# Patient Record
Sex: MALE | Race: WHITE | Employment: OTHER | URBAN - NONMETROPOLITAN AREA
[De-identification: names, ages, dates, MRNs, and addresses within clinical notes are randomized per-mention and may not be internally consistent; named-entity substitution may affect disease eponyms.]

---

## 2017-04-10 ENCOUNTER — OFFICE VISIT (OUTPATIENT)
Dept: FAMILY MEDICINE CLINIC | Age: 37
End: 2017-04-10

## 2017-04-10 VITALS
HEART RATE: 76 BPM | HEIGHT: 74 IN | SYSTOLIC BLOOD PRESSURE: 136 MMHG | BODY MASS INDEX: 26.56 KG/M2 | RESPIRATION RATE: 12 BRPM | WEIGHT: 207 LBS | DIASTOLIC BLOOD PRESSURE: 80 MMHG

## 2017-04-10 DIAGNOSIS — M79.605 LEFT LEG PAIN: Primary | ICD-10-CM

## 2017-04-10 DIAGNOSIS — L30.9 DERMATITIS: ICD-10-CM

## 2017-04-10 PROCEDURE — G8427 DOCREV CUR MEDS BY ELIG CLIN: HCPCS | Performed by: FAMILY MEDICINE

## 2017-04-10 PROCEDURE — 99213 OFFICE O/P EST LOW 20 MIN: CPT | Performed by: FAMILY MEDICINE

## 2017-04-10 PROCEDURE — 1036F TOBACCO NON-USER: CPT | Performed by: FAMILY MEDICINE

## 2017-04-10 PROCEDURE — G8419 CALC BMI OUT NRM PARAM NOF/U: HCPCS | Performed by: FAMILY MEDICINE

## 2017-04-10 RX ORDER — PREDNISONE 20 MG/1
TABLET ORAL
Qty: 15 TABLET | Refills: 0 | Status: SHIPPED | OUTPATIENT
Start: 2017-04-10 | End: 2017-04-20

## 2017-04-10 ASSESSMENT — ENCOUNTER SYMPTOMS
BACK PAIN: 0
SHORTNESS OF BREATH: 0
ABDOMINAL PAIN: 0
COUGH: 0
BLOOD IN STOOL: 0
CONSTIPATION: 0
SORE THROAT: 0
CHEST TIGHTNESS: 0
EYE PAIN: 0
NAUSEA: 0
DIARRHEA: 0
RHINORRHEA: 0
VOMITING: 0
WHEEZING: 0

## 2017-04-24 ENCOUNTER — OFFICE VISIT (OUTPATIENT)
Dept: FAMILY MEDICINE CLINIC | Age: 37
End: 2017-04-24

## 2017-04-24 VITALS
BODY MASS INDEX: 27.59 KG/M2 | WEIGHT: 208.2 LBS | HEIGHT: 73 IN | RESPIRATION RATE: 16 BRPM | HEART RATE: 104 BPM | DIASTOLIC BLOOD PRESSURE: 80 MMHG | SYSTOLIC BLOOD PRESSURE: 110 MMHG

## 2017-04-24 DIAGNOSIS — M54.42 ACUTE LEFT-SIDED LOW BACK PAIN WITH LEFT-SIDED SCIATICA: Primary | ICD-10-CM

## 2017-04-24 DIAGNOSIS — M79.605 LEFT LEG PAIN: ICD-10-CM

## 2017-04-24 PROCEDURE — 99213 OFFICE O/P EST LOW 20 MIN: CPT | Performed by: FAMILY MEDICINE

## 2017-04-24 PROCEDURE — G8419 CALC BMI OUT NRM PARAM NOF/U: HCPCS | Performed by: FAMILY MEDICINE

## 2017-04-24 PROCEDURE — 1036F TOBACCO NON-USER: CPT | Performed by: FAMILY MEDICINE

## 2017-04-24 PROCEDURE — G8427 DOCREV CUR MEDS BY ELIG CLIN: HCPCS | Performed by: FAMILY MEDICINE

## 2017-04-24 RX ORDER — DICLOFENAC SODIUM 75 MG/1
75 TABLET, DELAYED RELEASE ORAL 2 TIMES DAILY
Qty: 60 TABLET | Refills: 0 | Status: SHIPPED | OUTPATIENT
Start: 2017-04-24 | End: 2017-10-04 | Stop reason: ALTCHOICE

## 2017-04-24 ASSESSMENT — PATIENT HEALTH QUESTIONNAIRE - PHQ9
1. LITTLE INTEREST OR PLEASURE IN DOING THINGS: 0
2. FEELING DOWN, DEPRESSED OR HOPELESS: 0
SUM OF ALL RESPONSES TO PHQ QUESTIONS 1-9: 0
SUM OF ALL RESPONSES TO PHQ9 QUESTIONS 1 & 2: 0

## 2017-04-24 ASSESSMENT — ENCOUNTER SYMPTOMS
CHEST TIGHTNESS: 0
COUGH: 0
ABDOMINAL PAIN: 0
EYE PAIN: 0
RHINORRHEA: 0
SORE THROAT: 0
CONSTIPATION: 0
BLOOD IN STOOL: 0
BACK PAIN: 0
WHEEZING: 0
NAUSEA: 0
VOMITING: 0
SHORTNESS OF BREATH: 0
DIARRHEA: 0

## 2017-04-25 ENCOUNTER — TELEPHONE (OUTPATIENT)
Dept: FAMILY MEDICINE CLINIC | Age: 37
End: 2017-04-25

## 2017-09-22 ENCOUNTER — APPOINTMENT (OUTPATIENT)
Dept: GENERAL RADIOLOGY | Age: 37
End: 2017-09-22
Payer: OTHER MISCELLANEOUS

## 2017-09-22 ENCOUNTER — APPOINTMENT (OUTPATIENT)
Dept: CT IMAGING | Age: 37
End: 2017-09-22
Payer: OTHER MISCELLANEOUS

## 2017-09-22 ENCOUNTER — HOSPITAL ENCOUNTER (EMERGENCY)
Age: 37
Discharge: HOME | End: 2017-09-23
Attending: INTERNAL MEDICINE
Payer: OTHER MISCELLANEOUS
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
## 2017-09-22 LAB
ALBUMIN SERPL-MCNC: 4.6 G/DL (ref 3.5–5.1)
ALP BLD-CCNC: 64 U/L (ref 38–126)
ALT SERPL-CCNC: 23 U/L (ref 11–66)
AMPHETAMINE+METHAMPHETAMINE URINE SCREEN: NEGATIVE
ANGLE TEG: 65.5 DEG (ref 53–72)
ANION GAP SERPL CALCULATED.3IONS-SCNC: 12 MEQ/L (ref 8–16)
AST SERPL-CCNC: 19 U/L (ref 5–40)
BARBITURATE QUANTITATIVE URINE: NEGATIVE
BASOPHILS # BLD: 0.4 %
BASOPHILS ABSOLUTE: 0 THOU/MM3 (ref 0–0.1)
BENZODIAZEPINE QUANTITATIVE URINE: NEGATIVE
BILIRUB SERPL-MCNC: 1.7 MG/DL (ref 0.3–1.2)
BUN BLDV-MCNC: 9 MG/DL (ref 7–22)
CALCIUM SERPL-MCNC: 9.3 MG/DL (ref 8.5–10.5)
CANNABINOID QUANTITATIVE URINE: NEGATIVE
CHLORIDE BLD-SCNC: 100 MEQ/L (ref 98–111)
CO2: 25 MEQ/L (ref 23–33)
COCAINE METABOLITE QUANTITATIVE URINE: NEGATIVE
CREAT SERPL-MCNC: 0.7 MG/DL (ref 0.4–1.2)
EOSINOPHIL # BLD: 0.1 %
EOSINOPHILS ABSOLUTE: 0 THOU/MM3 (ref 0–0.4)
EPL-TEG: 0 %
ETHYL ALCOHOL, SERUM: < 0.01 %
GFR SERPL CREATININE-BSD FRML MDRD: > 90 ML/MIN/1.73M2
GLUCOSE BLD-MCNC: 103 MG/DL (ref 70–108)
HCT VFR BLD CALC: 43.3 % (ref 42–52)
HEMOGLOBIN: 14.9 GM/DL (ref 14–18)
HEPARIN THERAPY: NO
INHIBITION AA TEG: 12.5 %
INHIBITION ADP TEG: 69.1 %
KINETICS TEG: 1.8 MINUTES (ref 1–3)
LY30 (LYSIS) TEG: 0 % (ref 0–7.5)
LYMPHOCYTES # BLD: 9.7 %
LYMPHOCYTES ABSOLUTE: 0.9 THOU/MM3 (ref 1–4.8)
MA (MAX CLOT) TEG: 63.4 MM (ref 50–70)
MA(AA) TEG: 56.4 MM
MA(ACTIVATED) TEG: 7.4 MM
MA(ADP) TEG: 24.7 MM
MCH RBC QN AUTO: 27.7 PG (ref 27–31)
MCHC RBC AUTO-ENTMCNC: 34.5 GM/DL (ref 33–37)
MCV RBC AUTO: 80.1 FL (ref 80–94)
MICROCYTES: ABNORMAL
MONOCYTES # BLD: 5.3 %
MONOCYTES ABSOLUTE: 0.5 THOU/MM3 (ref 0.4–1.3)
NUCLEATED RED BLOOD CELLS: 0 /100 WBC
OPIATES, URINE: NEGATIVE
OSMOLALITY CALCULATION: 272.8 MOSMOL/KG (ref 275–300)
OXYCODONE: NEGATIVE
PDW BLD-RTO: 13.9 % (ref 11.5–14.5)
PHENCYCLIDINE QUANTITATIVE URINE: NEGATIVE
PLATELET # BLD: 176 THOU/MM3 (ref 130–400)
PMV BLD AUTO: 9.4 MCM (ref 7.4–10.4)
POTASSIUM SERPL-SCNC: 4.2 MEQ/L (ref 3.5–5.2)
RBC # BLD: 5.4 MILL/MM3 (ref 4.7–6.1)
RBC # BLD: NORMAL 10*6/UL
REACTION TIME TEG: 5.6 MINUTES (ref 5–10)
SEG NEUTROPHILS: 84.5 %
SEGMENTED NEUTROPHILS ABSOLUTE COUNT: 8.1 THOU/MM3 (ref 1.8–7.7)
SODIUM BLD-SCNC: 137 MEQ/L (ref 135–145)
TOTAL PROTEIN: 7.1 G/DL (ref 6.1–8)
WBC # BLD: 9.6 THOU/MM3 (ref 4.8–10.8)

## 2017-09-22 PROCEDURE — 80053 COMPREHEN METABOLIC PANEL: CPT

## 2017-09-22 PROCEDURE — 76376 3D RENDER W/INTRP POSTPROCES: CPT

## 2017-09-22 PROCEDURE — 36415 COLL VENOUS BLD VENIPUNCTURE: CPT

## 2017-09-22 PROCEDURE — 99285 EMERGENCY DEPT VISIT HI MDM: CPT

## 2017-09-22 PROCEDURE — 73030 X-RAY EXAM OF SHOULDER: CPT

## 2017-09-22 PROCEDURE — 85576 BLOOD PLATELET AGGREGATION: CPT

## 2017-09-22 PROCEDURE — 72125 CT NECK SPINE W/O DYE: CPT

## 2017-09-22 PROCEDURE — 6360000004 HC RX CONTRAST MEDICATION: Performed by: INTERNAL MEDICINE

## 2017-09-22 PROCEDURE — 90715 TDAP VACCINE 7 YRS/> IM: CPT | Performed by: INTERNAL MEDICINE

## 2017-09-22 PROCEDURE — 2580000003 HC RX 258: Performed by: INTERNAL MEDICINE

## 2017-09-22 PROCEDURE — L0120 CERV FLEX N/ADJ FOAM PRE OTS: HCPCS

## 2017-09-22 PROCEDURE — 71260 CT THORAX DX C+: CPT

## 2017-09-22 PROCEDURE — 74177 CT ABD & PELVIS W/CONTRAST: CPT

## 2017-09-22 PROCEDURE — 93005 ELECTROCARDIOGRAM TRACING: CPT | Performed by: INTERNAL MEDICINE

## 2017-09-22 PROCEDURE — 85025 COMPLETE CBC W/AUTO DIFF WBC: CPT

## 2017-09-22 PROCEDURE — 90471 IMMUNIZATION ADMIN: CPT | Performed by: INTERNAL MEDICINE

## 2017-09-22 PROCEDURE — 3209999900

## 2017-09-22 PROCEDURE — G0480 DRUG TEST DEF 1-7 CLASSES: HCPCS

## 2017-09-22 PROCEDURE — 6360000002 HC RX W HCPCS: Performed by: INTERNAL MEDICINE

## 2017-09-22 PROCEDURE — 73080 X-RAY EXAM OF ELBOW: CPT

## 2017-09-22 PROCEDURE — 70450 CT HEAD/BRAIN W/O DYE: CPT

## 2017-09-22 PROCEDURE — 80307 DRUG TEST PRSMV CHEM ANLYZR: CPT

## 2017-09-22 RX ORDER — 0.9 % SODIUM CHLORIDE 0.9 %
1000 INTRAVENOUS SOLUTION INTRAVENOUS ONCE
Status: COMPLETED | OUTPATIENT
Start: 2017-09-22 | End: 2017-09-22

## 2017-09-22 RX ADMIN — SODIUM CHLORIDE 1000 ML: 9 INJECTION, SOLUTION INTRAVENOUS at 19:46

## 2017-09-22 RX ADMIN — IOPAMIDOL 80 ML: 755 INJECTION, SOLUTION INTRAVENOUS at 21:23

## 2017-09-22 RX ADMIN — TETANUS TOXOID, REDUCED DIPHTHERIA TOXOID AND ACELLULAR PERTUSSIS VACCINE, ADSORBED 0.5 ML: 5; 2.5; 8; 8; 2.5 SUSPENSION INTRAMUSCULAR at 19:41

## 2017-09-22 ASSESSMENT — ENCOUNTER SYMPTOMS
RHINORRHEA: 0
EYE DISCHARGE: 0
DIARRHEA: 0
SHORTNESS OF BREATH: 0
ABDOMINAL PAIN: 0
VOMITING: 0
NAUSEA: 1
BACK PAIN: 0
SORE THROAT: 0
WHEEZING: 0
EYE REDNESS: 0
COUGH: 0

## 2017-09-22 ASSESSMENT — PAIN SCALES - GENERAL
PAINLEVEL_OUTOF10: 2
PAINLEVEL_OUTOF10: 2

## 2017-09-22 ASSESSMENT — PAIN DESCRIPTION - PAIN TYPE: TYPE: ACUTE PAIN

## 2017-09-22 ASSESSMENT — PAIN DESCRIPTION - DESCRIPTORS: DESCRIPTORS: SORE

## 2017-09-22 ASSESSMENT — PAIN DESCRIPTION - LOCATION: LOCATION: BACK;NECK

## 2017-09-23 VITALS
DIASTOLIC BLOOD PRESSURE: 79 MMHG | OXYGEN SATURATION: 97 % | HEART RATE: 74 BPM | HEIGHT: 73 IN | RESPIRATION RATE: 19 BRPM | SYSTOLIC BLOOD PRESSURE: 127 MMHG | WEIGHT: 210 LBS | TEMPERATURE: 99 F | BODY MASS INDEX: 27.83 KG/M2

## 2017-09-23 RX ORDER — HYDROCODONE BITARTRATE AND ACETAMINOPHEN 5; 325 MG/1; MG/1
1 TABLET ORAL DAILY PRN
Qty: 2 TABLET | Refills: 0 | Status: SHIPPED | OUTPATIENT
Start: 2017-09-23 | End: 2017-10-04 | Stop reason: ALTCHOICE

## 2017-09-24 LAB
EKG ATRIAL RATE: 84 BPM
EKG P AXIS: 53 DEGREES
EKG P-R INTERVAL: 138 MS
EKG Q-T INTERVAL: 374 MS
EKG QRS DURATION: 96 MS
EKG QTC CALCULATION (BAZETT): 441 MS
EKG R AXIS: 68 DEGREES
EKG T AXIS: 51 DEGREES
EKG VENTRICULAR RATE: 84 BPM

## 2017-09-26 ENCOUNTER — TELEPHONE (OUTPATIENT)
Dept: SURGERY | Age: 37
End: 2017-09-26

## 2017-09-26 ENCOUNTER — CARE COORDINATION (OUTPATIENT)
Dept: FAMILY MEDICINE CLINIC | Age: 37
End: 2017-09-26

## 2017-10-04 ENCOUNTER — OFFICE VISIT (OUTPATIENT)
Dept: FAMILY MEDICINE CLINIC | Age: 37
End: 2017-10-04
Payer: MEDICARE

## 2017-10-04 VITALS
BODY MASS INDEX: 26.49 KG/M2 | DIASTOLIC BLOOD PRESSURE: 84 MMHG | WEIGHT: 200.8 LBS | SYSTOLIC BLOOD PRESSURE: 120 MMHG | HEART RATE: 80 BPM | RESPIRATION RATE: 12 BRPM
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
## 2017-10-04 DIAGNOSIS — M54.2 CERVICAL PAIN (NECK): ICD-10-CM

## 2017-10-04 DIAGNOSIS — M25.512 ACUTE PAIN OF LEFT SHOULDER: ICD-10-CM

## 2017-10-04 PROCEDURE — 99213 OFFICE O/P EST LOW 20 MIN: CPT | Performed by: FAMILY MEDICINE

## 2017-10-04 RX ORDER — MELOXICAM 15 MG/1
15 TABLET ORAL DAILY
Qty: 30 TABLET | Refills: 0 | Status: SHIPPED | OUTPATIENT
Start: 2017-10-04 | End: 2018-08-06 | Stop reason: ALTCHOICE

## 2017-10-04 ASSESSMENT — ENCOUNTER SYMPTOMS
DIARRHEA: 0
SHORTNESS OF BREATH: 0
COUGH: 0
EYE PAIN: 0
BACK PAIN: 0
NAUSEA: 0
RHINORRHEA: 0
CHEST TIGHTNESS: 0
SORE THROAT: 0
BLOOD IN STOOL: 0
WHEEZING: 0
ABDOMINAL PAIN: 0
VOMITING: 0
CONSTIPATION: 0
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
## 2017-10-04 NOTE — PATIENT INSTRUCTIONS
worse.  · You are not getting better after 1 week. · You do not get better as expected. Where can you learn more? Go to https://chpepiceweb.DecImmune Therapeutics. org and sign in to your [email or web address].com account. Enter [id number] in the [facility] box to learn more about \"Neck Pain: Care Instructions. \"     If you do not have an account, please click on the \"Sign Up Now\" link. Current as of: March 21, 2017  Content Version: 11.3  © 5673-6783 Ovelin, BitRock. Care instructions adapted under license by [facility] ([facility]). If you have questions about a medical condition or this instruction, always ask your healthcare professional. [address] any warranty or liability for your use of this information. Neck: Exercises  Your Care Instructions  Here are some examples of typical rehabilitation exercises for your condition. Start each exercise slowly. Ease off the exercise if you start to have pain. Your doctor or physical therapist will tell you when you can start these exercises and which ones will work best for you. How to do the exercises  Note: Stretching should make you feel a gentle stretch, but no pain. Stop any strengthening exercise that makes pain worse. Neck stretch    1. This stretch works best if you keep your shoulder down as you lean away from it. To help you remember to do this, start by relaxing your shoulders and lightly holding on to your thighs or your chair. 2. Tilt your head toward your shoulder and hold for 15 to 30 seconds. Let the weight of your head stretch your muscles. 3. If you would like a little added stretch, use your hand to gently and steadily pull your head toward your shoulder. For example, keeping your right shoulder down, lean your head to the left. 4. Repeat 2 to 4 times toward each shoulder. Diagonal neck stretch    1.  Turn your head slightly toward the direction you will be stretching, and tilt your head diagonally toward your chest and hold for 15 to 30 seconds. 2. If you would like a little added stretch, use your hand to gently and steadily pull your head forward on the diagonal.  3. Repeat 2 to 4 times toward each side. Dorsal glide stretch    1. Sit or stand tall and look straight ahead. 2. Slowly tuck your chin as you glide your head backward over your body  3. Hold for a count of 6, and then relax for up to 10 seconds. 4. Repeat 8 to 12 times. Note: The dorsal glide stretches the back of the neck. If you feel pain, do not glide so far back. Some people find this exercise easier to do while lying on their backs with an ice pack on the neck. Chest and shoulder stretch    1. Sit or stand tall and glide your head backward as in the dorsal glide stretch. 2. Raise both arms so that your hands are next to your ears. 3. Take a deep breath, and as you breathe out, lower your elbows down and behind your back. You will feel your shoulder blades slide down and together, and at the same time you will feel a stretch across your chest and the front of your shoulders. 4. Hold for about 6 seconds, and then relax for up to 10 seconds. 5. Repeat 8 to 12 times. Strengthening: Hands on head    1. Move your head backward, forward, and side to side against gentle pressure from your hands, holding each position for about 6 seconds. 2. Repeat 8 to 12 times. Follow-up care is a key part of your treatment and safety. Be sure to make and go to all appointments, and call your doctor if you are having problems. It's also a good idea to know your test results and keep a list of the medicines you take. Where can you learn more? Go to https://MBA Polymerspepiceweb.South49 Solutions. org and sign in to your Daily Deals for Moms account. Enter P975 in the Brightgeist Media box to learn more about \"Neck: Exercises. \"     If you do not have an account, please click on the \"Sign Up Now\" link. Current as of: March 21, 2017  Content Version: 11.3  © 5942-9942 Juxta Labs, [facility].  Care the shoulder, but do not lift anything heavy. When should you call for help? Call 911 anytime you think you may need emergency care. For example, call if:  · You have chest pain or pressure. This may occur with:  ¨ Sweating. ¨ Shortness of breath. ¨ Nausea or vomiting. ¨ Pain that spreads from the chest to the neck, jaw, or one or both shoulders or arms. ¨ Dizziness or lightheadedness. ¨ A fast or uneven pulse. After calling 911, chew 1 adult-strength aspirin. Wait for an ambulance. Do not try to drive yourself. · Your arm or hand is cool or pale or changes color. Call your doctor now or seek immediate medical care if:  · You have signs of infection, such as:  ¨ Increased pain, swelling, warmth, or redness in your shoulder. ¨ Red streaks leading from a place on your shoulder. ¨ Pus draining from an area of your shoulder. ¨ Swollen lymph nodes in your neck, armpits, or groin. ¨ A fever. Watch closely for changes in your health, and be sure to contact your doctor if:  · You cannot use your shoulder. · Your shoulder does not get better as expected. Where can you learn more? Go to https://Value and Budget Housing Corporation.Quinnova Pharmaceuticals. org and sign in to your CityCiv account. Enter R207 in the Moneythink box to learn more about \"Shoulder Pain: Care Instructions. \"     If you do not have an account, please click on the \"Sign Up Now\" link. Current as of: March 21, 2017  Content Version: 11.3  © 4978-7272 SiteWit. Care instructions adapted under license by [facility] ([facility]). If you have questions about a medical condition or this instruction, always ask your healthcare professional. [place] [address] any warranty or liability for your use of this information. Shoulder Stretches: Exercises  Your Care Instructions  Here are some examples of exercises for your shoulder. Start each exercise slowly. Ease off the exercise if you start to have pain.   Your doctor or physical information.
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
## 2018-08-06 ENCOUNTER — OFFICE VISIT (OUTPATIENT)
Dept: FAMILY MEDICINE CLINIC | Age: 38
End: 2018-08-06
Payer: MEDICARE

## 2018-08-06 VITALS
HEART RATE: 74 BPM | SYSTOLIC BLOOD PRESSURE: 124 MMHG | WEIGHT: 192.4 LBS | BODY MASS INDEX: 25.38 KG/M2 | DIASTOLIC BLOOD PRESSURE: 82 MMHG | RESPIRATION RATE: 12 BRPM

## 2018-08-06 DIAGNOSIS — R03.0 ELEVATED BLOOD PRESSURE READING: Primary | ICD-10-CM

## 2018-08-06 DIAGNOSIS — R21 SCROTAL RASH: ICD-10-CM

## 2018-08-06 PROCEDURE — G8419 CALC BMI OUT NRM PARAM NOF/U: HCPCS | Performed by: FAMILY MEDICINE

## 2018-08-06 PROCEDURE — G8427 DOCREV CUR MEDS BY ELIG CLIN: HCPCS | Performed by: FAMILY MEDICINE

## 2018-08-06 PROCEDURE — 99213 OFFICE O/P EST LOW 20 MIN: CPT | Performed by: FAMILY MEDICINE

## 2018-08-06 PROCEDURE — 1036F TOBACCO NON-USER: CPT | Performed by: FAMILY MEDICINE

## 2018-08-06 RX ORDER — CEPHALEXIN 500 MG/1
500 CAPSULE ORAL 2 TIMES DAILY
Qty: 20 CAPSULE | Refills: 0 | Status: SHIPPED | OUTPATIENT
Start: 2018-08-06 | End: 2018-08-16

## 2018-08-06 RX ORDER — PREDNISONE 20 MG/1
TABLET ORAL
Qty: 15 TABLET | Refills: 0 | Status: SHIPPED | OUTPATIENT
Start: 2018-08-06 | End: 2020-02-18

## 2018-08-06 ASSESSMENT — PATIENT HEALTH QUESTIONNAIRE - PHQ9
SUM OF ALL RESPONSES TO PHQ QUESTIONS 1-9: 0
SUM OF ALL RESPONSES TO PHQ9 QUESTIONS 1 & 2: 0
2. FEELING DOWN, DEPRESSED OR HOPELESS: 0
1. LITTLE INTEREST OR PLEASURE IN DOING THINGS: 0

## 2018-08-06 ASSESSMENT — ENCOUNTER SYMPTOMS
WHEEZING: 0
SORE THROAT: 0
CHEST TIGHTNESS: 0
COUGH: 0
SHORTNESS OF BREATH: 0
RHINORRHEA: 0
BACK PAIN: 0
DIARRHEA: 0
VOMITING: 0
CONSTIPATION: 0
ABDOMINAL PAIN: 0
NAUSEA: 0
BLOOD IN STOOL: 0
EYE PAIN: 0

## 2018-08-06 NOTE — PROGRESS NOTES
Normal range of motion. He exhibits no edema. Lymphadenopathy:     He has no cervical adenopathy. Neurological: He is alert and oriented to person, place, and time. He has normal reflexes. No cranial nerve deficit. Skin: Skin is warm and dry. No rash noted. Psychiatric: He has a normal mood and affect. Nursing note and vitals reviewed.       Assessment:      Elevated blood pressure reading by history  Scrotal rash      Plan:      Monitor BP, no treatment needed  Prednisone 20 mg BID/QD  Keflex 500 mg BID x 10 days

## 2018-08-06 NOTE — PATIENT INSTRUCTIONS
minutes of exercise on most days of the week. Walking is a good choice. You also may want to do other activities, such as running, swimming, cycling, or playing tennis or team sports. · Avoid or limit alcohol. Talk to your doctor about whether you can drink any alcohol. · Eat plenty of fruits, vegetables, and low-fat dairy products. Eat less saturated and total fats. · Learn how to check your blood pressure at home. When should you call for help? Call your doctor now or seek immediate medical care if:    · Your blood pressure is much higher than normal (such as 180/110 or higher).     · You think high blood pressure is causing symptoms such as:  ¨ Severe headache. ¨ Blurry vision.    Watch closely for changes in your health, and be sure to contact your doctor if:    · You do not get better as expected. Where can you learn more? Go to https://SavingGlobalpeScraperWiki.Jing-Jin Electric Technologies. org and sign in to your WorkshopLive account. Enter P059 in the [facility] box to learn more about \"Elevated Blood Pressure: Care Instructions. \"     If you do not have an account, please click on the \"Sign Up Now\" link. Current as of: May 10, 2017  Content Version: 11.6  © 2142-1295 Fluther. Care instructions adapted under license by [facility] ([facility]). If you have questions about a medical condition or this instruction, always ask your healthcare professional. [place] [address] any warranty or liability for your use of this information. Patient Education        Rash: Care Instructions  Your Care Instructions  A rash is any irritation or inflammation of the skin. Rashes have many possible causes, including allergy, infection, illness, heat, and emotional stress. Follow-up care is a key part of your treatment and safety. Be sure to make and go to all appointments, and call your doctor if you are having problems.  It's also a good idea to know your test results and keep a list of the medicines you Health. If you have questions about a medical condition or this instruction, always ask your healthcare professional. [place] [address] any warranty or liability for your use of this information.

## 2019-08-24 ENCOUNTER — HOSPITAL ENCOUNTER (EMERGENCY)
Age: 39
Discharge: HOME | End: 2019-08-24
Payer: MEDICARE

## 2019-08-24 VITALS
HEART RATE: 92 BPM | TEMPERATURE: 98.4 F | DIASTOLIC BLOOD PRESSURE: 76 MMHG | BODY MASS INDEX: 27.44 KG/M2 | OXYGEN SATURATION: 97 % | SYSTOLIC BLOOD PRESSURE: 126 MMHG | WEIGHT: 208 LBS | RESPIRATION RATE: 18 BRPM

## 2019-08-24 DIAGNOSIS — R11.2 NAUSEA VOMITING AND DIARRHEA: Primary | ICD-10-CM

## 2019-08-24 DIAGNOSIS — R19.7 NAUSEA VOMITING AND DIARRHEA: Primary | ICD-10-CM

## 2019-08-24 LAB
ALBUMIN SERPL-MCNC: 4.6 G/DL (ref 3.5–5.1)
ALP BLD-CCNC: 56 U/L (ref 38–126)
ALT SERPL-CCNC: 24 U/L (ref 11–66)
ANION GAP SERPL CALCULATED.3IONS-SCNC: 14 MEQ/L (ref 8–16)
AST SERPL-CCNC: 17 U/L (ref 5–40)
BASOPHILS # BLD: 0.4 %
BASOPHILS ABSOLUTE: 0 THOU/MM3 (ref 0–0.1)
BILIRUB SERPL-MCNC: 2.6 MG/DL (ref 0.3–1.2)
BILIRUBIN DIRECT: 0.3 MG/DL (ref 0–0.3)
BILIRUBIN URINE: NEGATIVE
BLOOD, URINE: NEGATIVE
BUN BLDV-MCNC: 14 MG/DL (ref 7–22)
CALCIUM SERPL-MCNC: 9.4 MG/DL (ref 8.5–10.5)
CHARACTER, URINE: CLEAR
CHLORIDE BLD-SCNC: 102 MEQ/L (ref 98–111)
CO2: 23 MEQ/L (ref 23–33)
COLOR: YELLOW
CREAT SERPL-MCNC: 0.6 MG/DL (ref 0.4–1.2)
EOSINOPHIL # BLD: 0.2 %
EOSINOPHILS ABSOLUTE: 0 THOU/MM3 (ref 0–0.4)
ERYTHROCYTE [DISTWIDTH] IN BLOOD BY AUTOMATED COUNT: 13.1 % (ref 11.5–14.5)
ERYTHROCYTE [DISTWIDTH] IN BLOOD BY AUTOMATED COUNT: 38.5 FL (ref 35–45)
GFR SERPL CREATININE-BSD FRML MDRD: > 90 ML/MIN/1.73M2
GLUCOSE BLD-MCNC: 117 MG/DL (ref 70–108)
GLUCOSE URINE: NEGATIVE MG/DL
HCT VFR BLD CALC: 47.4 % (ref 42–52)
HEMOGLOBIN: 16.1 GM/DL (ref 14–18)
IMMATURE GRANS (ABS): 0.03 THOU/MM3 (ref 0–0.07)
IMMATURE GRANULOCYTES: 0 %
KETONES, URINE: 15
LEUKOCYTE ESTERASE, URINE: NEGATIVE
LIPASE: 21.4 U/L (ref 5.6–51.3)
LYMPHOCYTES # BLD: 4.4 %
LYMPHOCYTES ABSOLUTE: 0.4 THOU/MM3 (ref 1–4.8)
MCH RBC QN AUTO: 27.7 PG (ref 26–33)
MCHC RBC AUTO-ENTMCNC: 34 GM/DL (ref 32.2–35.5)
MCV RBC AUTO: 81.4 FL (ref 80–94)
MONOCYTES # BLD: 5.7 %
MONOCYTES ABSOLUTE: 0.5 THOU/MM3 (ref 0.4–1.3)
NITRITE, URINE: NEGATIVE
NUCLEATED RED BLOOD CELLS: 0 /100 WBC
OSMOLALITY CALCULATION: 279 MOSMOL/KG (ref 275–300)
PH UA: 8.5 (ref 5–9)
PLATELET # BLD: 175 THOU/MM3 (ref 130–400)
PMV BLD AUTO: 10.8 FL (ref 9.4–12.4)
POTASSIUM REFLEX MAGNESIUM: 4 MEQ/L (ref 3.5–5.2)
PROTEIN UA: NEGATIVE
RBC # BLD: 5.82 MILL/MM3 (ref 4.7–6.1)
SEG NEUTROPHILS: 89 %
SEGMENTED NEUTROPHILS ABSOLUTE COUNT: 8.2 THOU/MM3 (ref 1.8–7.7)
SODIUM BLD-SCNC: 139 MEQ/L (ref 135–145)
SPECIFIC GRAVITY, URINE: 1.02 (ref 1–1.03)
TOTAL PROTEIN: 7.2 G/DL (ref 6.1–8)
UROBILINOGEN, URINE: 0.2 EU/DL (ref 0–1)
WBC # BLD: 9.2 THOU/MM3 (ref 4.8–10.8)

## 2019-08-24 PROCEDURE — 80076 HEPATIC FUNCTION PANEL: CPT

## 2019-08-24 PROCEDURE — 36415 COLL VENOUS BLD VENIPUNCTURE: CPT

## 2019-08-24 PROCEDURE — 99283 EMERGENCY DEPT VISIT LOW MDM: CPT

## 2019-08-24 PROCEDURE — 83690 ASSAY OF LIPASE: CPT

## 2019-08-24 PROCEDURE — 85025 COMPLETE CBC W/AUTO DIFF WBC: CPT

## 2019-08-24 PROCEDURE — 96374 THER/PROPH/DIAG INJ IV PUSH: CPT

## 2019-08-24 PROCEDURE — 81003 URINALYSIS AUTO W/O SCOPE: CPT

## 2019-08-24 PROCEDURE — 6360000002 HC RX W HCPCS: Performed by: EMERGENCY MEDICINE

## 2019-08-24 PROCEDURE — 2580000003 HC RX 258: Performed by: EMERGENCY MEDICINE

## 2019-08-24 PROCEDURE — 80053 COMPREHEN METABOLIC PANEL: CPT

## 2019-08-24 RX ORDER — ONDANSETRON 2 MG/ML
4 INJECTION INTRAMUSCULAR; INTRAVENOUS ONCE
Status: COMPLETED | OUTPATIENT
Start: 2019-08-24 | End: 2019-08-24

## 2019-08-24 RX ORDER — ONDANSETRON 4 MG/1
4 TABLET, ORALLY DISINTEGRATING ORAL EVERY 8 HOURS PRN
Qty: 15 TABLET | Refills: 0 | Status: SHIPPED | OUTPATIENT
Start: 2019-08-24 | End: 2020-02-18

## 2019-08-24 RX ORDER — 0.9 % SODIUM CHLORIDE 0.9 %
500 INTRAVENOUS SOLUTION INTRAVENOUS ONCE
Status: COMPLETED | OUTPATIENT
Start: 2019-08-24 | End: 2019-08-24

## 2019-08-24 RX ORDER — 0.9 % SODIUM CHLORIDE 0.9 %
1000 INTRAVENOUS SOLUTION INTRAVENOUS ONCE
Status: COMPLETED | OUTPATIENT
Start: 2019-08-24 | End: 2019-08-24

## 2019-08-24 RX ADMIN — SODIUM CHLORIDE 500 ML: 9 INJECTION, SOLUTION INTRAVENOUS at 15:40

## 2019-08-24 RX ADMIN — SODIUM CHLORIDE 1000 ML: 9 INJECTION, SOLUTION INTRAVENOUS at 13:31

## 2019-08-24 RX ADMIN — ONDANSETRON 4 MG: 2 INJECTION INTRAMUSCULAR; INTRAVENOUS at 13:31

## 2019-08-24 ASSESSMENT — PAIN SCALES - GENERAL: PAINLEVEL_OUTOF10: 5

## 2019-08-24 ASSESSMENT — PAIN DESCRIPTION - PAIN TYPE: TYPE: ACUTE PAIN

## 2019-08-24 ASSESSMENT — ENCOUNTER SYMPTOMS
BACK PAIN: 0
ABDOMINAL DISTENTION: 1
COUGH: 0
NAUSEA: 1
DIARRHEA: 1
COLOR CHANGE: 0
VOMITING: 1
SHORTNESS OF BREATH: 0
ABDOMINAL PAIN: 1

## 2019-08-24 ASSESSMENT — PAIN DESCRIPTION - FREQUENCY: FREQUENCY: CONTINUOUS

## 2019-08-24 ASSESSMENT — PAIN DESCRIPTION - LOCATION: LOCATION: ABDOMEN

## 2019-08-24 ASSESSMENT — PAIN DESCRIPTION - DESCRIPTORS: DESCRIPTORS: ACHING

## 2019-08-24 NOTE — ED PROVIDER NOTES
file.    CURRENT MEDICATIONS       Discharge Medication List as of 8/24/2019  3:25 PM      CONTINUE these medications which have NOT CHANGED    Details   predniSONE (DELTASONE) 20 MG tablet Take 1 tab BID x 5 days, then 1 tab daily x 5 days. Take with food. , Disp-15 tablet, R-0Normal             ALLERGIES     has No Known Allergies. FAMILY HISTORY     He indicated that his mother is alive. He indicated that his father is alive. family history includes High Blood Pressure in his father; High Cholesterol in his mother. SOCIAL HISTORY      reports that he has never smoked. He has never used smokeless tobacco. He reports that he does not drink alcohol or use drugs. PHYSICAL EXAM     INITIAL VITALS:  weight is 208 lb (94.3 kg). His oral temperature is 98.4 °F (36.9 °C). His blood pressure is 126/76 and his pulse is 92. His respiration is 18 and oxygen saturation is 97%. Physical Exam   Constitutional: He is oriented to person, place, and time. He appears well-developed and well-nourished. No distress. HENT:   Head: Normocephalic. Eyes: Pupils are equal, round, and reactive to light. Neck: Normal range of motion. Cardiovascular: Regular rhythm. Pulmonary/Chest: Effort normal and breath sounds normal. No respiratory distress. Abdominal: Soft. Bowel sounds are normal. There is tenderness in the right upper quadrant and right lower quadrant. There is no rigidity, no guarding, no tenderness at McBurney's point and negative Simental's sign. Neurological: He is alert and oriented to person, place, and time. Skin: Skin is warm and dry. Capillary refill takes less than 2 seconds. Psychiatric: He has a normal mood and affect.  His behavior is normal. Thought content normal.        DIFFERENTIAL DIAGNOSIS:   Gastroenteritis, cholecystitis, pancreatitis, less likely appendicitis    DIAGNOSTIC RESULTS     EKG: All EKG's are interpreted by the Emergency Department Physician who either signs or Co-signs DISPOSITION/PLAN   Discharge    PATIENT REFERRED TO:  Beryle Hoots, MD  [address]  [phone]    In 2 days        DISCHARGE MEDICATIONS:  Discharge Medication List as of 8/24/2019  3:25 PM      START taking these medications    Details   ondansetron (ZOFRAN ODT) 4 MG disintegrating tablet Take 1 tablet by mouth every 8 hours as needed for Nausea or Vomiting, Disp-15 tablet, R-0Print             (Please note that portions of this note were completed with a voice recognition program.  Efforts were made to edit the dictations but occasionally words are mis-transcribed.)    The patient was given an opportunity to see the Emergency Attending. The patient voiced understanding that I was a Mid-LevelProvider and was in agreement with being seen independently by myself.           Derryl Dakin, APRN - CNP  08/24/19 9241

## 2019-08-26 ENCOUNTER — TELEPHONE (OUTPATIENT)
Dept: FAMILY MEDICINE CLINIC | Age: 39
End: 2019-08-26

## 2020-02-18 ENCOUNTER — HOSPITAL ENCOUNTER (EMERGENCY)
Age: 40
Discharge: HOME | End: 2020-02-18
Attending: EMERGENCY MEDICINE
Payer: MEDICARE

## 2020-02-18 VITALS
BODY MASS INDEX: 26.77 KG/M2 | WEIGHT: 202 LBS | SYSTOLIC BLOOD PRESSURE: 130 MMHG | DIASTOLIC BLOOD PRESSURE: 78 MMHG | TEMPERATURE: 100.2 F | RESPIRATION RATE: 16 BRPM | OXYGEN SATURATION: 96 % | HEART RATE: 118 BPM | HEIGHT: 73 IN

## 2020-02-18 LAB
FLU A ANTIGEN: POSITIVE
FLU B ANTIGEN: NEGATIVE

## 2020-02-18 PROCEDURE — 99213 OFFICE O/P EST LOW 20 MIN: CPT

## 2020-02-18 PROCEDURE — 87804 INFLUENZA ASSAY W/OPTIC: CPT

## 2020-02-18 PROCEDURE — 99214 OFFICE O/P EST MOD 30 MIN: CPT | Performed by: EMERGENCY MEDICINE

## 2020-02-18 RX ORDER — PROMETHAZINE HYDROCHLORIDE AND CODEINE PHOSPHATE 6.25; 1 MG/5ML; MG/5ML
5 SYRUP ORAL 4 TIMES DAILY PRN
Qty: 120 ML | Refills: 0 | Status: SHIPPED | OUTPATIENT
Start: 2020-02-18 | End: 2020-02-22

## 2020-02-18 RX ORDER — ACETAMINOPHEN 500 MG
1000 TABLET ORAL EVERY 6 HOURS PRN
COMMUNITY
End: 2022-03-09

## 2020-02-18 RX ORDER — ONDANSETRON 4 MG/1
4 TABLET, ORALLY DISINTEGRATING ORAL 4 TIMES DAILY PRN
Qty: 12 TABLET | Refills: 0 | Status: SHIPPED | OUTPATIENT
Start: 2020-02-18 | End: 2020-03-01

## 2020-02-18 ASSESSMENT — ENCOUNTER SYMPTOMS
EYE DISCHARGE: 0
WHEEZING: 0
SHORTNESS OF BREATH: 0
STRIDOR: 0
BACK PAIN: 0
VOMITING: 0
DIARRHEA: 0
SORE THROAT: 0
NAUSEA: 0
TROUBLE SWALLOWING: 0
EYE PAIN: 0
EYE REDNESS: 0
RHINORRHEA: 1
SINUS PRESSURE: 1
ABDOMINAL PAIN: 0
VOICE CHANGE: 1
COUGH: 1

## 2020-02-18 ASSESSMENT — PAIN DESCRIPTION - LOCATION: LOCATION: RIB CAGE;CHEST

## 2020-02-18 ASSESSMENT — PAIN SCALES - GENERAL: PAINLEVEL_OUTOF10: 8

## 2020-02-18 NOTE — ED PROVIDER NOTES
systems reviewed and are negative. PAST MEDICAL HISTORY         Diagnosis Date    Headache     MR (mental retardation)     IQ - 72   Multiple fractures treated nonsurgically    SURGICAL HISTORY     Patient  has no past surgical history on file. No previous surgeries. CURRENT MEDICATIONS       Discharge Medication List as of 2/18/2020 11:46 AM      CONTINUE these medications which have NOT CHANGED    Details   acetaminophen (TYLENOL) 500 MG tablet Take 1,000 mg by mouth every 6 hours as needed for PainHistorical Med      UNKNOWN TO PATIENT Historical Med             ALLERGIES     Patient is has No Known Allergies. FAMILY HISTORY     Patient'sfamily history includes High Blood Pressure in his father; High Cholesterol in his mother. SOCIAL HISTORY     Patient  reports that he has never smoked. He has never used smokeless tobacco. He reports that he does not drink alcohol or use drugs. PHYSICAL EXAM     ED TRIAGE VITALS  BP: 130/78, Temp: 100.2 °F (37.9 °C), Pulse: 118, Resp: 16, SpO2: 96 %  Physical Exam  Vitals signs and nursing note reviewed. Constitutional:       Appearance: He is well-developed. Comments: Nasal voice, dry cough, moist membranes   HENT:      Head: Normocephalic and atraumatic. Right Ear: Tympanic membrane and external ear normal.      Left Ear: Tympanic membrane and external ear normal.      Nose: Congestion and rhinorrhea present. Right Sinus: No maxillary sinus tenderness or frontal sinus tenderness. Left Sinus: No maxillary sinus tenderness or frontal sinus tenderness. Mouth/Throat:      Pharynx: Posterior oropharyngeal erythema present. No oropharyngeal exudate. Comments: Pharyngeal erythema  Eyes:      General: No scleral icterus. Right eye: No discharge. Left eye: No discharge. Extraocular Movements:      Right eye: Normal extraocular motion. Left eye: Normal extraocular motion.       Conjunctiva/sclera: Conjunctivae Labs:   Results for orders placed or performed during the hospital encounter of 02/18/20   Rapid influenza A/B antigens   Result Value Ref Range    Flu A Antigen POSITIVE (A) NEGATIVE    Flu B Antigen NEGATIVE NEGATIVE       IMAGING:  No orders to display     URGENT CARE COURSE:     Vitals:    02/18/20 1109   BP: 130/78   Pulse: 118   Resp: 16   Temp: 100.2 °F (37.9 °C)   TempSrc: Infrared   SpO2: 96%   Weight: 202 lb (91.6 kg)   Height: 6' 1\" (1.854 m)       Medications - No data to display  PROCEDURES:  None  FINALIMPRESSION      1. Influenza A    2. Acute febrile illness        DISPOSITION/PLAN   DISPOSITION    Nontoxic, well-hydrated, normal airway. No airway abscess or epiglottitis, sepsis, CNS infection, pneumonia, hypoxia, bronchospasm. Rapid influenza A positive. No bacterial infection. Patient not a Tamiflu candidate due to duration of illness. Will treat with Phenergan with codeine,, Tylenol, Zofran, vaporizer, clear liquid diet, rest.  Patient to recheck with PCP in 3 days if problems persist, and he understands to go to ED if worse. PATIENT REFERRED TO:  Zaida Jenkins MD  [address] [place] [address]  [phone]    Schedule an appointment as soon as possible for a visit in 3 days  Recheck if problems persist, go to emergency if worse    DISCHARGE MEDICATIONS:  Discharge Medication List as of 2/18/2020 11:46 AM      START taking these medications    Details   promethazine-codeine (PHENERGAN WITH CODEINE) 6.25-10 MG/5ML syrup Take 5 mLs by mouth 4 times daily as needed for Cough for up to 4 days.  Caution will cause drowsiness, Disp-120 mL, R-0Print           Discharge Medication List as of 2/18/2020 11:46 AM      CONTINUE these medications which have CHANGED    Details   ondansetron (ZOFRAN ODT) 4 MG disintegrating tablet Take 1 tablet by mouth 4 times daily as needed for Nausea or Vomiting (Dissolve on tongue 4 times daily for nausea or vomiting), Disp-12 tablet, R-0Print MD Fransisca Godwin MD  02/18/20 7303

## 2020-02-19 ENCOUNTER — TELEPHONE (OUTPATIENT)
Dept: FAMILY MEDICINE CLINIC | Age: 40
End: 2020-02-19

## 2020-02-19 NOTE — TELEPHONE ENCOUNTER
[facility]) ED Follow up Call    Reason for ED visit:  Influenza A    Mychart letter sent to patient          FU appts/Provider:    No future appointments.

## 2020-10-05 ENCOUNTER — HOSPITAL ENCOUNTER (EMERGENCY)
Age: 40
Discharge: HOME | End: 2020-10-05
Payer: MEDICARE

## 2020-10-05 VITALS
BODY MASS INDEX: 25.73 KG/M2 | DIASTOLIC BLOOD PRESSURE: 85 MMHG | TEMPERATURE: 99.3 F | RESPIRATION RATE: 16 BRPM | SYSTOLIC BLOOD PRESSURE: 146 MMHG | HEART RATE: 81 BPM | OXYGEN SATURATION: 99 % | WEIGHT: 195 LBS

## 2020-10-05 PROCEDURE — 99212 OFFICE O/P EST SF 10 MIN: CPT

## 2020-10-05 PROCEDURE — 99213 OFFICE O/P EST LOW 20 MIN: CPT | Performed by: NURSE PRACTITIONER

## 2020-10-05 RX ORDER — CEPHALEXIN 500 MG/1
500 CAPSULE ORAL 3 TIMES DAILY
Qty: 30 CAPSULE | Refills: 0 | Status: SHIPPED | OUTPATIENT
Start: 2020-10-05 | End: 2020-10-15

## 2020-10-05 ASSESSMENT — PAIN DESCRIPTION - LOCATION: LOCATION: FINGER (COMMENT WHICH ONE)

## 2020-10-05 ASSESSMENT — ENCOUNTER SYMPTOMS
VOMITING: 0
COLOR CHANGE: 1
NAUSEA: 0
SHORTNESS OF BREATH: 0

## 2020-10-05 ASSESSMENT — PAIN SCALES - GENERAL: PAINLEVEL_OUTOF10: 5

## 2020-10-05 ASSESSMENT — PAIN DESCRIPTION - PAIN TYPE: TYPE: ACUTE PAIN

## 2020-10-05 ASSESSMENT — PAIN DESCRIPTION - DESCRIPTORS: DESCRIPTORS: ACHING;THROBBING

## 2020-10-05 ASSESSMENT — PAIN DESCRIPTION - ORIENTATION: ORIENTATION: LEFT

## 2020-10-05 NOTE — ED NOTES
Pt. Released in stable condition, ambulated per self to private car. Instructed pt to follow-up with family doctor as needed for recheck or go directly to the emergency department for any concerns/worsening conditions. Pt. Verbalized understanding of instructions. No questions at this time. RX in hand.       Indu Rios RN  10/05/20 0065

## 2020-10-05 NOTE — ED PROVIDER NOTES
[facility]  Urgent Care Encounter       CHIEF COMPLAINT       Chief Complaint   Patient presents with    Abscess     left thumb near nail onset sat. Nurses Notes reviewed and I agree except as noted in the HPI. HISTORY OF PRESENT ILLNESS   Cecille Busch is a 41 y.o. male who presents for evaluation of redness and swelling to the nail border of the left thumb. Patient states that this is been ongoing for the past 2 days and seems to becoming more painful and more swollen. He states that he has been trying ice, cool water and warm water at home without much relief. He denies any fever, chills, nausea, or vomiting. The history is provided by the patient. REVIEW OF SYSTEMS     Review of Systems   Constitutional: Negative for chills and fever. Respiratory: Negative for shortness of breath. Cardiovascular: Negative for chest pain. Gastrointestinal: Negative for nausea and vomiting. Musculoskeletal: Negative for arthralgias and myalgias. Skin: Positive for color change and wound. Negative for rash. Allergic/Immunologic: Negative for environmental allergies. Neurological: Negative for weakness and numbness. PAST MEDICAL HISTORY         Diagnosis Date    Headache     MR (mental retardation)     IQ - 72       SURGICALHISTORY     Patient  has no past surgical history on file. CURRENT MEDICATIONS       Previous Medications    ACETAMINOPHEN (TYLENOL) 500 MG TABLET    Take 1,000 mg by mouth every 6 hours as needed for Pain    UNKNOWN TO PATIENT           ALLERGIES     Patient is has No Known Allergies. Patients   Immunization History   Administered Date(s) Administered    Tdap (Boostrix, Adacel) 06/30/2005, 09/22/2017       FAMILY HISTORY     Patient's family history includes High Blood Pressure in his father; High Cholesterol in his mother. SOCIAL HISTORY     Patient  reports that he has never smoked.  He has never used smokeless tobacco. He reports that he does not drink alcohol or use drugs. PHYSICAL EXAM     ED TRIAGE VITALS  BP: (!) 146/85, Temp: 99.3 °F (37.4 °C), Pulse: 81, Resp: 16, SpO2: 99 %,Estimated body mass index is 25.73 kg/m² as calculated from the following:    Height as of 2/18/20: 6' 1\" (1.854 m). Weight as of this encounter: 195 lb (88.5 kg). ,No LMP for male patient. Physical Exam  Vitals signs and nursing note reviewed. Constitutional:       General: He is not in acute distress. Appearance: He is well-developed. He is not diaphoretic. Eyes:      Conjunctiva/sclera:      Right eye: Right conjunctiva is not injected. Left eye: Left conjunctiva is not injected. Pupils: Pupils are equal.   Neck:      Musculoskeletal: Normal range of motion. Cardiovascular:      Rate and Rhythm: Normal rate and regular rhythm. Heart sounds: No murmur. Pulmonary:      Effort: Pulmonary effort is normal. No respiratory distress. Breath sounds: Normal breath sounds. Musculoskeletal:      Right knee: He exhibits normal range of motion. Left knee: He exhibits normal range of motion. Left hand: He exhibits tenderness (at site of redness and swelling) and swelling (and redness to ungal border of thumb). He exhibits normal range of motion and normal capillary refill. Normal sensation noted. Skin:     General: Skin is warm. Findings: No rash. Neurological:      Mental Status: He is alert and oriented to person, place, and time. Psychiatric:         Behavior: Behavior normal.         DIAGNOSTIC RESULTS     Labs:No results found for this visit on 10/05/20. IMAGING:    No orders to display         EKG:  none    URGENT CARE COURSE:     Vitals:    10/05/20 1917   BP: (!) 146/85   Pulse: 81   Resp: 16   Temp: 99.3 °F (37.4 °C)   TempSrc: Temporal   SpO2: 99%   Weight: 195 lb (88.5 kg)       Medications - No data to display         PROCEDURES:  None    FINAL IMPRESSION      1.  Paronychia of finger of left hand DISPOSITION/ PLAN     Exam is consistent with paronychia of the left thumb and I discussed with the patient the plan to treat with both oral and topical antibiotics. He is advised to use over-the-counter Tylenol and ibuprofen as needed and that warm Epson salt soaks would be beneficial.  He is instructed to return or follow-up with his PCP if his symptoms do not improve or present to the ER if they would seem to worsen. He is agreeable to plan as discussed. PATIENT REFERRED TO:  Chloé Valenzuela MD  [address] [place] [address] / [address] [place] [id number]      DISCHARGE MEDICATIONS:  New Prescriptions    CEPHALEXIN (KEFLEX) 500 MG CAPSULE    Take 1 capsule by mouth 3 times daily for 10 days    MUPIROCIN (BACTROBAN) 2 % OINTMENT    Apply topically 3 times daily.        Discontinued Medications    No medications on file       Current Discharge Medication List          EMRE Pineda CNP    (Please note that portions of this note were completed with a voice recognition program. Efforts were made to edit the dictations but occasionally words are mis-transcribed.)         EMRE Pineda CNP  10/05/20 1933

## 2020-10-06 ENCOUNTER — TELEPHONE (OUTPATIENT)
Dept: FAMILY MEDICINE CLINIC | Age: 40
End: 2020-10-06

## 2020-10-06 NOTE — LETTER
[address] [facility]  [address] [place] [address] Rd, [address] [facility]  Phone: [phone]  Fax: [phone]    October 6, 2020    [address] [facility] [address]    Dear Dario Adhikari,    Thank you for choosing our [facility] on 10/5/20. Dr. Angelina Rodrigues wanted to make sure that you understand your discharge instructions and that you were able to fill any prescriptions that may have been ordered for you. Please contact the office at the above phone number if advised you to follow up with Dr. Angelina Rodrigues, or if you have any further questions or needs. Also, did you know -                             [facility]) practices can often offer you an appointment on the same day that you call for acute issues. *We have some [id number] [facility] offices that offer Walk-in appointments; check our website for availability in your community, [email or web address].      *Evisits are now available for patients through [address] If you do not have MyChart and are interested, please contact the office and a staff member may assist you or go to [email or web address].     Sincerely,   Angelina Rodrigues MD and your [facility]

## 2021-11-14 ENCOUNTER — HOSPITAL ENCOUNTER (EMERGENCY)
Age: 41
Discharge: HOME | End: 2021-11-14
Payer: MEDICARE

## 2021-11-14 VITALS
HEIGHT: 72 IN | SYSTOLIC BLOOD PRESSURE: 136 MMHG | OXYGEN SATURATION: 98 % | HEART RATE: 79 BPM | WEIGHT: 200 LBS | TEMPERATURE: 97.8 F | DIASTOLIC BLOOD PRESSURE: 90 MMHG | BODY MASS INDEX: 27.09 KG/M2 | RESPIRATION RATE: 18 BRPM

## 2021-11-14 DIAGNOSIS — B02.9 HERPES ZOSTER WITHOUT COMPLICATION: Primary | ICD-10-CM

## 2021-11-14 PROCEDURE — 99213 OFFICE O/P EST LOW 20 MIN: CPT

## 2021-11-14 RX ORDER — VALACYCLOVIR HYDROCHLORIDE 1 G/1
1000 TABLET, FILM COATED ORAL 3 TIMES DAILY
Qty: 21 TABLET | Refills: 0 | Status: SHIPPED | OUTPATIENT
Start: 2021-11-14 | End: 2021-11-21

## 2021-11-14 ASSESSMENT — ENCOUNTER SYMPTOMS
SHORTNESS OF BREATH: 0
NAUSEA: 0
SORE THROAT: 0
VOMITING: 0
COUGH: 0

## 2021-11-14 NOTE — ED TRIAGE NOTES
Pt presents to UC with c/o right sided flank rash. Pt denies itching or pain. Pt reports he is concerned for scabies.

## 2021-11-14 NOTE — ED PROVIDER NOTES
He will follow-up on an outpatient basis as needed.       PATIENT REFERRED TO:  Shakira Frank MD  [address] [facility] / [facility] [address]      DISCHARGE MEDICATIONS:  New Prescriptions    VALACYCLOVIR (VALTREX) 1 G TABLET    Take 1 tablet by mouth 3 times daily for 7 days       Discontinued Medications    No medications on file       Current Discharge Medication List          EMRE Graham CNP    (Please note that portions of this note were completed with a voice recognition program. Efforts were made to edit the dictations but occasionally words are mis-transcribed.)          EMRE Graham CNP  11/14/21 1046

## 2022-02-28 ENCOUNTER — HOSPITAL ENCOUNTER (EMERGENCY)
Age: 42
Discharge: HOME | End: 2022-02-28
Payer: MEDICARE

## 2022-02-28 VITALS
WEIGHT: 195 LBS | DIASTOLIC BLOOD PRESSURE: 76 MMHG | HEIGHT: 73 IN | RESPIRATION RATE: 16 BRPM | HEART RATE: 86 BPM | SYSTOLIC BLOOD PRESSURE: 135 MMHG | TEMPERATURE: 97.8 F | OXYGEN SATURATION: 98 % | BODY MASS INDEX: 25.84 KG/M2

## 2022-02-28 DIAGNOSIS — R21 RASH AND OTHER NONSPECIFIC SKIN ERUPTION: Primary | ICD-10-CM

## 2022-02-28 PROCEDURE — 99213 OFFICE O/P EST LOW 20 MIN: CPT

## 2022-02-28 PROCEDURE — 99213 OFFICE O/P EST LOW 20 MIN: CPT | Performed by: EMERGENCY MEDICINE

## 2022-02-28 RX ORDER — TRIAMCINOLONE ACETONIDE 0.25 MG/G
OINTMENT TOPICAL
Qty: 15 G | Refills: 0 | Status: SHIPPED | OUTPATIENT
Start: 2022-02-28 | End: 2022-03-07

## 2022-02-28 ASSESSMENT — ENCOUNTER SYMPTOMS: COUGH: 0

## 2022-02-28 NOTE — ED PROVIDER NOTES
[facility]  Urgent Care Encounter       CHIEF COMPLAINT       Chief Complaint   Patient presents with    Rash     on arm right       Nurses Notes reviewed and I agree except as noted in the HPI. HISTORY OF PRESENT ILLNESS   Corby Lozano is a 35 y.o. male who presents For rash that is developed to the left wrist and forearm. This is been present for 2 days. He states he is used some of his mom's cream that helped with the rash. Denies any new exposure. States it is pruritic. Denies animals in the house. Denies exposure to bedbugs. REVIEW OF SYSTEMS     Review of Systems   Constitutional: Negative for fatigue and fever. Respiratory: Negative for cough. Skin: Positive for rash (left wrist and forearm). Neurological: Negative for dizziness, light-headedness and headaches. PAST MEDICAL HISTORY         Diagnosis Date    Headache     MR (mental retardation)     IQ - 72       SURGICALHISTORY     Patient  has no past surgical history on file. CURRENT MEDICATIONS       Discharge Medication List as of 2/28/2022  3:27 PM      CONTINUE these medications which have NOT CHANGED    Details   acetaminophen (TYLENOL) 500 MG tablet Take 1,000 mg by mouth every 6 hours as needed for PainHistorical Med      UNKNOWN TO PATIENT Historical Med             ALLERGIES     Patient is has No Known Allergies. Patients   Immunization History   Administered Date(s) Administered    Tdap (Boostrix, Adacel) 06/30/2005, 09/22/2017       FAMILY HISTORY     Patient's family history includes High Blood Pressure in his father; High Cholesterol in his mother. SOCIAL HISTORY     Patient  reports that he has never smoked. He has never used smokeless tobacco. He reports that he does not drink alcohol and does not use drugs.     PHYSICAL EXAM     ED TRIAGE VITALS  BP: 135/76, Temp: 97.8 °F (36.6 °C), Pulse: 86, Resp: 16, SpO2: 98 %,Estimated body mass index is 25.73 kg/m² as calculated from the following:    Height as of this encounter: 6' 1\" (1.854 m). Weight as of this encounter: 195 lb (88.5 kg). ,No LMP for male patient. Physical Exam  Constitutional:       General: He is not in acute distress. Appearance: He is not ill-appearing. Cardiovascular:      Rate and Rhythm: Normal rate. Pulmonary:      Effort: Pulmonary effort is normal.   Skin:     General: Skin is warm and dry. Comments: Several scattered slightly raised macular lesions to left wrist and forearm. No distinct pattern I do have a central shun worrisome for insect bites   Neurological:      Mental Status: He is alert. Psychiatric:         Mood and Affect: Mood normal.         Behavior: Behavior normal.         DIAGNOSTIC RESULTS     Labs:No results found for this visit on 02/28/22. IMAGING:    No orders to display         EKG:      URGENT CARE COURSE:     Vitals:    02/28/22 1510   BP: 135/76   Pulse: 86   Resp: 16   Temp: 97.8 °F (36.6 °C)   TempSrc: Temporal   SpO2: 98%   Weight: 195 lb (88.5 kg)   Height: 6' 1\" (1.854 m)       Medications - No data to display         PROCEDURES:  None    FINAL IMPRESSION      1. Rash and other nonspecific skin eruption          DISPOSITION/ PLAN     Presents for rash to the left forearm. Possible insect bites. Offered permethrin cream in addition to Kenalog cream to use if patient has no improvement. I advised this could be bedbug or scabies bites. Patient declines. He will use the Kenalog cream as directed. Return if no improvement 3 to 5 days      PATIENT REFERRED TO:  An Encarnacion MD  [address] [place] [address] / [facility] [address]      DISCHARGE MEDICATIONS:  Discharge Medication List as of 2/28/2022  3:27 PM      START taking these medications    Details   triamcinolone (KENALOG) 0.025 % ointment Apply topically 2 times daily. , Disp-15 g, R-0, Normal             Discharge Medication List as of 2/28/2022  3:27 PM          Discharge Medication List as of 2/28/2022 3:27 PM          Lennon Bohr, APRN - CNP    (Please note that portions of this note were completed with a voice recognition program. Efforts were made to edit the dictations but occasionally words are mis-transcribed.)          EMRE Rueda CNP  02/28/22 1536

## 2022-03-02 ENCOUNTER — TELEPHONE (OUTPATIENT)
Dept: FAMILY MEDICINE CLINIC | Age: 42
End: 2022-03-02

## 2022-03-04 ENCOUNTER — HOSPITAL ENCOUNTER (EMERGENCY)
Age: 42
Discharge: HOME | End: 2022-03-04
Attending: EMERGENCY MEDICINE
Payer: MEDICARE

## 2022-03-04 VITALS
RESPIRATION RATE: 16 BRPM | HEART RATE: 75 BPM | OXYGEN SATURATION: 97 % | SYSTOLIC BLOOD PRESSURE: 129 MMHG | TEMPERATURE: 99 F | DIASTOLIC BLOOD PRESSURE: 80 MMHG

## 2022-03-04 DIAGNOSIS — L20.9 ATOPIC DERMATITIS, UNSPECIFIED TYPE: Primary | ICD-10-CM

## 2022-03-04 PROCEDURE — 99213 OFFICE O/P EST LOW 20 MIN: CPT

## 2022-03-04 PROCEDURE — 99213 OFFICE O/P EST LOW 20 MIN: CPT | Performed by: EMERGENCY MEDICINE

## 2022-03-04 ASSESSMENT — ENCOUNTER SYMPTOMS
BACK PAIN: 0
TROUBLE SWALLOWING: 0
STRIDOR: 0
WHEEZING: 0
EYE REDNESS: 0
ABDOMINAL PAIN: 0
EYE PAIN: 0
DIARRHEA: 0
VOICE CHANGE: 0
NAUSEA: 0
COUGH: 0
EYE DISCHARGE: 0
SHORTNESS OF BREATH: 0
SINUS PRESSURE: 0
VOMITING: 0
SORE THROAT: 0

## 2022-03-04 NOTE — ED PROVIDER NOTES
38 Tamie Montero       Chief Complaint   Patient presents with    Rash       Nurses Notes reviewed and I agree except as noted in the HPI. HISTORY OF PRESENT ILLNESS   Garcia Rose is a 35 y.o. male who presents with persistent itchy rash on upper extremities and trunk. Patient evaluated for dermatitis at [facility] urgent care, 2/28/22 and prescribed Kenalog ointment. No chest pain, shortness breath, stridor, wheezing, fever, jaundice, vomiting, painful or swollen glands. Occupational exposure to chemicals. Takes long showers with hot water. No new medications, soaps, cosmetics, foods. No history of diabetes or asthma. REVIEW OF SYSTEMS     Review of Systems   Constitutional: Negative for appetite change, chills, fatigue, fever and unexpected weight change. Normal appetite no fever   HENT: Negative for congestion, ear discharge, ear pain, sinus pressure, sneezing, sore throat, trouble swallowing and voice change. No upper respiratory symptoms   Eyes: Negative for pain, discharge and redness. No redness or drainage   Respiratory: Negative for cough, shortness of breath, wheezing and stridor. No cough or shortness of breath   Cardiovascular: Negative for chest pain and leg swelling. No chest pain or syncope   Gastrointestinal: Negative for abdominal pain, diarrhea, nausea and vomiting. No abdominal pain or vomit   Genitourinary: Negative for dysuria, frequency, hematuria and urgency. Musculoskeletal: Negative for arthralgias, back pain, myalgias and neck pain. Skin: Negative for rash. Itchy rash upper extremities and trunk   Neurological: Negative for dizziness, syncope, weakness and headaches. No headache   Hematological: Negative for adenopathy. Psychiatric/Behavioral: Negative for behavioral problems, confusion, sleep disturbance and suicidal ideas.  The patient is not nervous/anxious. red and bold elements reviewed    PAST MEDICAL HISTORY         Diagnosis Date    Headache     MR (mental retardation)     IQ - 72       SURGICAL HISTORY     Patient  has no past surgical history on file. CURRENT MEDICATIONS       Discharge Medication List as of 3/4/2022  3:40 PM      CONTINUE these medications which have NOT CHANGED    Details   triamcinolone (KENALOG) 0.025 % ointment Apply topically 2 times daily. , Disp-15 g, R-0, Normal      acetaminophen (TYLENOL) 500 MG tablet Take 1,000 mg by mouth every 6 hours as needed for PainHistorical Med             ALLERGIES     Patient is has No Known Allergies. FAMILY HISTORY     Patient'sfamily history includes High Blood Pressure in his father; High Cholesterol in his mother. SOCIAL HISTORY     Patient  reports that he has never smoked. He has never used smokeless tobacco. He reports that he does not drink alcohol and does not use drugs. PHYSICAL EXAM     ED TRIAGE VITALS  BP: 129/80, Temp: 99 °F (37.2 °C), Pulse: 75, Resp: 16, SpO2: 97 %  Physical Exam  Vitals and nursing note reviewed. Constitutional:       General: He is not in acute distress. Appearance: He is well-developed. He is not ill-appearing. Comments: Moist Membranes, normal airway   HENT:      Head: Normocephalic and atraumatic. Right Ear: External ear normal.      Left Ear: External ear normal.      Nose: Nose normal.      Mouth/Throat:      Pharynx: No oropharyngeal exudate. Comments: Oropharynx normal  Eyes:      General: No scleral icterus. Right eye: No discharge. Left eye: No discharge. Extraocular Movements:      Right eye: Normal extraocular motion. Left eye: Normal extraocular motion. Conjunctiva/sclera: Conjunctivae normal.      Pupils: Pupils are equal, round, and reactive to light. Comments: Conjunctiva clear   Neck:      Thyroid: No thyromegaly. Vascular: No JVD.       Comments: No meningismus  Cardiovascular:      Rate and Rhythm: Normal rate and regular rhythm. Pulses: Normal pulses. Heart sounds: Normal heart sounds, S1 normal and S2 normal. No murmur heard. No friction rub. No gallop. Comments: No murmur  Pulmonary:      Effort: Pulmonary effort is normal. No tachypnea or respiratory distress. Breath sounds: Normal breath sounds. No stridor. No decreased breath sounds, wheezing, rhonchi or rales. Comments: No cough lungs clear  Chest:      Chest wall: No tenderness. Abdominal:      General: Bowel sounds are normal. There is no distension. Palpations: Abdomen is soft. There is no mass. Tenderness: There is no abdominal tenderness. There is no guarding or rebound. Musculoskeletal:         General: No tenderness. Normal range of motion. Cervical back: Normal range of motion. No spinous process tenderness or muscular tenderness. Comments: Extremities normal   Lymphadenopathy:      Cervical: No cervical adenopathy. Right cervical: No superficial cervical adenopathy. Left cervical: No superficial cervical adenopathy. Skin:     General: Skin is warm and dry. Findings: No erythema or rash. Comments: Atopic dermatitis on forearms. No infestation. No bacterial infection   Neurological:      Mental Status: He is alert and oriented to person, place, and time. Cranial Nerves: No cranial nerve deficit. Motor: No abnormal muscle tone. Coordination: Coordination normal.      Deep Tendon Reflexes: Reflexes are normal and symmetric. Reflexes normal.      Comments: Appropriate no focal   Psychiatric:         Behavior: Behavior normal.         Thought Content: Thought content normal.         Judgment: Judgment normal.         DIAGNOSTIC RESULTS   Labs: No results found for this visit on 03/04/22.     IMAGING:  No orders to display     URGENT CARE COURSE:     Vitals:    03/04/22 1457   BP: 129/80   Pulse: 75   Resp: 16 Temp: 99 °F (37.2 °C)   TempSrc: Infrared   SpO2: 97%       Medications - No data to display  PROCEDURES:  None  FINALIMPRESSION      1. Atopic dermatitis, unspecified type        DISPOSITION/PLAN   DISPOSITION Decision To Discharge 03/04/2022 03:37:36 PM  Nontoxic, well-hydrated, normal airway. No airway abscess or epiglottitis, sepsis, CNS infection, pneumonia, hypoxia, bronchospasm. No anaphylactic or anaphylactoid reaction, angioedema, urticaria, SJS, TEN. No infestation. No bacterial infection. Patient has atopic dermatitis. Etiology unclear. May be due to occupational exposure. Will treat with Valisone, Zyrtec, increased oral clear liquids, hypoallergenic soap. Patient to recheck with PCP in 5 days for reevaluation, and understands to go to ED if worse  PATIENT REFERRED TO:  Monika Joshi MD  [address] [place] [address]  [phone]    Schedule an appointment as soon as possible for a visit in 5 days  Recheck in office, go to emergency if worse    DISCHARGE MEDICATIONS:  Discharge Medication List as of 3/4/2022  3:40 PM      START taking these medications    Details   betamethasone valerate (VALISONE) 0.1 % cream Apply topically 2 times daily. , Disp-45 g, R-1, Print           Discharge Medication List as of 3/4/2022  3:40 PM          MD Bailee Diggs MD  03/04/22 9696

## 2022-03-08 ENCOUNTER — TELEPHONE (OUTPATIENT)
Dept: FAMILY MEDICINE CLINIC | Age: 42
End: 2022-03-08

## 2022-03-08 NOTE — LETTER
.. [address], [facility]  Phone: [phone]  Fax: [phone]    March 8, 2022    [address] [facility] [address]    Dear Jackelin Anderson,    Thank you for choosing our [facility] on 3/4/22. Dr. Claudette Salas wanted to make sure that you understand your discharge instructions and that you were able to fill any prescriptions that may have been ordered for you. Please contact the office at the above phone number if advised you to follow up with Dr. Claudette Saals, or if you have any further questions or needs. Also, did you know -                             [facility]) practices can often offer you an appointment on the same day that you call for acute issues. *We have some [facility] offices that offer Walk-in appointments; check our website for availability in your community, [email or web address].      *Evisits are now available for patients through [address] If you do not have MyChart and are interested, please contact the office and a staff member may assist you or go to [email or web address].     Sincerely,     Claudette Salas MD and your [facility]

## 2022-03-09 ENCOUNTER — OFFICE VISIT (OUTPATIENT)
Dept: FAMILY MEDICINE CLINIC | Age: 42
End: 2022-03-09
Payer: MEDICARE

## 2022-03-09 VITALS
SYSTOLIC BLOOD PRESSURE: 128 MMHG | BODY MASS INDEX: 26.25 KG/M2 | HEART RATE: 74 BPM | DIASTOLIC BLOOD PRESSURE: 66 MMHG | RESPIRATION RATE: 18 BRPM | WEIGHT: 199 LBS

## 2022-03-09 DIAGNOSIS — L30.9 DERMATITIS: Primary | ICD-10-CM

## 2022-03-09 PROCEDURE — G8484 FLU IMMUNIZE NO ADMIN: HCPCS | Performed by: FAMILY MEDICINE

## 2022-03-09 PROCEDURE — G8427 DOCREV CUR MEDS BY ELIG CLIN: HCPCS | Performed by: FAMILY MEDICINE

## 2022-03-09 PROCEDURE — 1036F TOBACCO NON-USER: CPT | Performed by: FAMILY MEDICINE

## 2022-03-09 PROCEDURE — 99213 OFFICE O/P EST LOW 20 MIN: CPT | Performed by: FAMILY MEDICINE

## 2022-03-09 PROCEDURE — G8419 CALC BMI OUT NRM PARAM NOF/U: HCPCS | Performed by: FAMILY MEDICINE

## 2022-03-09 RX ORDER — PREDNISONE 20 MG/1
TABLET ORAL
Qty: 15 TABLET | Refills: 0 | Status: SHIPPED | OUTPATIENT
Start: 2022-03-09 | End: 2022-06-14

## 2022-03-09 SDOH — ECONOMIC STABILITY: FOOD INSECURITY: WITHIN THE PAST 12 MONTHS, THE FOOD YOU BOUGHT JUST DIDN'T LAST AND YOU DIDN'T HAVE MONEY TO GET MORE.: NEVER TRUE

## 2022-03-09 SDOH — ECONOMIC STABILITY: FOOD INSECURITY: WITHIN THE PAST 12 MONTHS, YOU WORRIED THAT YOUR FOOD WOULD RUN OUT BEFORE YOU GOT MONEY TO BUY MORE.: NEVER TRUE

## 2022-03-09 ASSESSMENT — ENCOUNTER SYMPTOMS
COUGH: 0
VOMITING: 0
BLOOD IN STOOL: 0
DIARRHEA: 0
WHEEZING: 0
SORE THROAT: 0
EYE PAIN: 0
CHEST TIGHTNESS: 0
CONSTIPATION: 0
SHORTNESS OF BREATH: 0
ABDOMINAL PAIN: 0
RHINORRHEA: 0
NAUSEA: 0
BACK PAIN: 0

## 2022-03-09 ASSESSMENT — PATIENT HEALTH QUESTIONNAIRE - PHQ9
SUM OF ALL RESPONSES TO PHQ QUESTIONS 1-9: 0
SUM OF ALL RESPONSES TO PHQ QUESTIONS 1-9: 0
2. FEELING DOWN, DEPRESSED OR HOPELESS: 0
SUM OF ALL RESPONSES TO PHQ QUESTIONS 1-9: 0
SUM OF ALL RESPONSES TO PHQ9 QUESTIONS 1 & 2: 0
1. LITTLE INTEREST OR PLEASURE IN DOING THINGS: 0
SUM OF ALL RESPONSES TO PHQ QUESTIONS 1-9: 0

## 2022-03-09 ASSESSMENT — SOCIAL DETERMINANTS OF HEALTH (SDOH): HOW HARD IS IT FOR YOU TO PAY FOR THE VERY BASICS LIKE FOOD, HOUSING, MEDICAL CARE, AND HEATING?: NOT HARD AT ALL

## 2022-03-09 NOTE — PATIENT INSTRUCTIONS
Patient Education        Dermatitis: Care Instructions  Your Care Instructions  Dermatitis is the general name used for any rash or inflammation of the skin. Different kinds of dermatitis cause different kinds of rashes. Common causes of a rash include new medicines, plants (such as poison oak or poison ivy), heat, and stress. Certain illnesses can also cause a rash. An allergic reaction to something that touches your skin, such as latex, nickel, or poison ivy, is called contact dermatitis. Contact dermatitis may also be caused by something that irritates the skin, such as bleach, a chemical, or soap. These types of rashes cannot be spread from person to person. How long your rash will last depends on what caused it. Rashes may last a few days or months. Follow-up care is a key part of your treatment and safety. Be sure to make and go to all appointments, and call your doctor if you are having problems. It's also a good idea to know your test results and keep a list of the medicines you take. How can you care for yourself at home? · Do not scratch the rash. Cut your nails short, and file them smooth. Or wear gloves if this helps keep you from scratching. · Wash the area with water only. Pat dry. · Put cold, wet cloths on the rash to reduce itching. · Keep cool, and stay out of the sun. · Leave the rash open to the air as much as possible. · If the rash itches, use hydrocortisone cream. Follow the directions on the label. Calamine lotion may help for plant rashes. · If itching affects your sleep, ask your doctor if you can take an antihistamine that might reduce itching and make you sleepy, such as diphenhydramine (Benadryl). Be safe with medicines. Read and follow all instructions on the label. · If your doctor prescribed a cream, use it as directed. If your doctor prescribed medicine, take it exactly as directed. When should you call for help?    Call your doctor now or seek immediate medical care if:    · You have symptoms of infection, such as:  ? Increased pain, swelling, warmth, or redness. ? Red streaks leading from the area. ? Pus draining from the area. ? A fever.     · You have joint pain along with the rash. Watch closely for changes in your health, and be sure to contact your doctor if:    · Your rash is changing or getting worse.     · You are not getting better as expected. Where can you learn more? Go to https://[employer] ImagingpeVarcity Sports.PandaBed. org and sign in to your [employer] account. Enter [phone] in the [employer] box to learn more about \"Dermatitis: Care Instructions. \"     If you do not have an account, please click on the \"Sign Up Now\" link. Current as of: March 3, 2021               Content Version: 13.1  © 2651-0501 Healthwise, Incorporated. Care instructions adapted under license by [facility] ([facility]). If you have questions about a medical condition or this instruction, always ask your healthcare professional. [place] [address] any warranty or liability for your use of this information.

## 2022-03-09 NOTE — PROGRESS NOTES
Teresa Corado (:  1980) is a 35 y.o. male,Established patient, here for evaluation of the following chief complaint(s):  Rash         ASSESSMENT/PLAN:  1. Dermatitis  -     predniSONE (DELTASONE) 20 MG tablet; Take 1 tab BID x 5 days, then 1 tab daily x 5 days. Take with food. , Disp-15 tablet, R-0Normal  -trial of oral steroids, failed topicals, -monitor sxs, call if not improving, consider derm    No follow-ups on file. Subjective   SUBJECTIVE/OBJECTIVE:  HPI  Patient here today for a check up. Reviewed BMI of 26. Here for follow up of 2 UC/ED visits for arm rash. Topical steroid creams given, ineffective. Itching. Single, nonsmoker, pmh reviewed. Review of Systems   Constitutional: Negative for chills, fatigue, fever and unexpected weight change. HENT: Negative for congestion, ear pain, rhinorrhea and sore throat. Eyes: Negative for pain and visual disturbance. Respiratory: Negative for cough, chest tightness, shortness of breath and wheezing. Cardiovascular: Negative for chest pain and palpitations. Gastrointestinal: Negative for abdominal pain, blood in stool, constipation, diarrhea, nausea and vomiting. Genitourinary: Negative for difficulty urinating, frequency, hematuria and urgency. Musculoskeletal: Negative for back pain, joint swelling, myalgias and neck pain. Skin: Positive for rash. Neurological: Negative for dizziness and headaches. Hematological: Negative for adenopathy. Does not bruise/bleed easily. Psychiatric/Behavioral: Negative for behavioral problems and sleep disturbance. The patient is not nervous/anxious. Objective   Physical Exam  Vitals and nursing note reviewed. Constitutional:       Appearance: He is well-developed. HENT:      Head: Normocephalic and atraumatic.       Right Ear: External ear normal.      Left Ear: External ear normal.      Nose: Nose normal.      Mouth/Throat:      Mouth: Mucous membranes are moist.   Eyes: Pupils: Pupils are equal, round, and reactive to light. Neck:      Thyroid: No thyromegaly. Cardiovascular:      Rate and Rhythm: Normal rate and regular rhythm. Heart sounds: Normal heart sounds. Pulmonary:      Breath sounds: Normal breath sounds. No wheezing or rales. Abdominal:      General: Bowel sounds are normal.      Palpations: Abdomen is soft. Tenderness: There is no abdominal tenderness. There is no guarding or rebound. Musculoskeletal:         General: Normal range of motion. Cervical back: Neck supple. Lymphadenopathy:      Cervical: No cervical adenopathy. Skin:     General: Skin is warm and dry. Findings: Rash present. Rash is macular and papular. Neurological:      Mental Status: He is alert and oriented to person, place, and time. Cranial Nerves: No cranial nerve deficit. Deep Tendon Reflexes: Reflexes are normal and symmetric. An electronic signature was used to authenticate this note.     --All Barnett MD

## 2022-06-14 ENCOUNTER — HOSPITAL ENCOUNTER (EMERGENCY)
Age: 42
Discharge: HOME | End: 2022-06-14
Attending: EMERGENCY MEDICINE
Payer: MEDICARE

## 2022-06-14 VITALS
RESPIRATION RATE: 18 BRPM | BODY MASS INDEX: 27.09 KG/M2 | HEIGHT: 72 IN | TEMPERATURE: 98 F | HEART RATE: 78 BPM | OXYGEN SATURATION: 97 % | DIASTOLIC BLOOD PRESSURE: 74 MMHG | WEIGHT: 200 LBS | SYSTOLIC BLOOD PRESSURE: 134 MMHG

## 2022-06-14 DIAGNOSIS — L30.9 DERMATITIS: Primary | ICD-10-CM

## 2022-06-14 PROCEDURE — 99213 OFFICE O/P EST LOW 20 MIN: CPT

## 2022-06-14 PROCEDURE — 99213 OFFICE O/P EST LOW 20 MIN: CPT | Performed by: EMERGENCY MEDICINE

## 2022-06-14 ASSESSMENT — ENCOUNTER SYMPTOMS
NAUSEA: 0
EYE DISCHARGE: 0
ABDOMINAL PAIN: 0
SINUS PRESSURE: 0
WHEEZING: 0
VOMITING: 0
SHORTNESS OF BREATH: 0
STRIDOR: 0
VOICE CHANGE: 0
DIARRHEA: 0
BACK PAIN: 0
TROUBLE SWALLOWING: 0
SORE THROAT: 0
EYE PAIN: 0
COUGH: 0
EYE REDNESS: 0

## 2022-06-14 ASSESSMENT — PAIN - FUNCTIONAL ASSESSMENT: PAIN_FUNCTIONAL_ASSESSMENT: NONE - DENIES PAIN

## 2022-06-14 NOTE — ED TRIAGE NOTES
Pt presents to  with c/o rash to bilateral arms, abdomen, and back that started two weeks ago. Pt reports itching mildly. Pt reports he has been outside in the heat everyday.

## 2022-06-14 NOTE — ED PROVIDER NOTES
[phone]       Chief Complaint   Patient presents with    Rash       Nurses Notes reviewed and I agree except as noted in the HPI. HISTORY OF PRESENT ILLNESS   Maldonado Winn is a 35 y.o. male who presents with itchy rash for 5 months duration, not responding to topical corticosteroids prescribed by PCP. See also [facility] urgent care evaluation 3/4/2022. Patient instructed by PCP to follow-up for testing and he has been unable to do this. He does work in the heat and this seems to make the rash worse. He has occupational exposures to allergens. Rash not painful. No purulent discharge. No chest pain, shortness of breath, wheezing fever, vomiting, joint pain or joint swelling, genital ulcers or penile discharge. No history of asthma or diabetes. REVIEW OF SYSTEMS     Review of Systems   Constitutional: Negative for appetite change, chills, fatigue, fever and unexpected weight change. Normal appetite no fever   HENT: Negative for congestion, ear discharge, ear pain, sinus pressure, sneezing, sore throat, trouble swallowing and voice change. No upper respiratory symptoms   Eyes: Negative for pain, discharge and redness. No redness or drainage   Respiratory: Negative for cough, shortness of breath, wheezing and stridor. No cough or shortness of breath   Cardiovascular: Negative for chest pain and leg swelling. No chest pain or syncope   Gastrointestinal: Negative for abdominal pain, diarrhea, nausea and vomiting. No abdominal pain or vomiting   Genitourinary: Negative for dysuria, frequency, hematuria and urgency. No  symptoms   Musculoskeletal: Negative for arthralgias, back pain, myalgias and neck pain. No joint pain or joint swelling   Skin: Positive for rash. Itchy generalized rash   Neurological: Negative for dizziness, syncope, weakness and headaches.         No headache or lethargy   Hematological: Negative for adenopathy. No swollen glands   Psychiatric/Behavioral: Negative for behavioral problems, confusion, sleep disturbance and suicidal ideas. The patient is not nervous/anxious. red and bold elements reviewed    PAST MEDICAL HISTORY         Diagnosis Date    Headache     MR (mental retardation)     IQ - 72       SURGICAL HISTORY     Patient  has no past surgical history on file. CURRENT MEDICATIONS       Discharge Medication List as of 6/14/2022  2:49 PM          ALLERGIES     Patient is has No Known Allergies. FAMILY HISTORY     Patient'sfamily history includes High Blood Pressure in his father; High Cholesterol in his mother. SOCIAL HISTORY     Patient  reports that he has never smoked. He has never used smokeless tobacco. He reports that he does not drink alcohol and does not use drugs. PHYSICAL EXAM     ED TRIAGE VITALS  BP: 134/74, Temp: 98 °F (36.7 °C), Heart Rate: 78, Resp: 18, SpO2: 97 %  Physical Exam    DIAGNOSTIC RESULTS   Labs: No results found for this visit on 06/14/22. IMAGING:  No orders to display     URGENT CARE COURSE:     Vitals:    06/14/22 1420   BP: 134/74   Pulse: 78   Resp: 18   Temp: 98 °F (36.7 °C)   SpO2: 97%   Weight: 200 lb (90.7 kg)   Height: 6' (1.829 m)       Medications - No data to display  PROCEDURES:  None  FINALIMPRESSION      1. Dermatitis        DISPOSITION/PLAN   DISPOSITION Decision To Discharge 06/14/2022 02:38:11 PM  Nontoxic, well-hydrated, normal airway. Patient presents with a longstanding generalized pruritic rash with multiple potential etiologies including secondary syphilis, scabies, atopic dermatitis. Patient will require testing in the office ASAP. Patient to call his PCP today for immediate follow-up.   He understands to go to ED if worse  PATIENT REFERRED TO:  Bora Garcia, [facility] [address]  [phone]    In 1 day  Follow-up with your PCP ASAP    DISCHARGE MEDICATIONS:  Discharge Medication List as of 6/14/2022  2:49 PM        Discharge Medication List as of 6/14/2022  2:49 PM          MD Hilda Burns MD  06/14/22 [address] [facility] [address] [facility] [address] Luke Akers MD  06/14/22 6876

## 2022-06-15 ENCOUNTER — OFFICE VISIT (OUTPATIENT)
Dept: FAMILY MEDICINE CLINIC | Age: 42
End: 2022-06-15
Payer: MEDICARE

## 2022-06-15 VITALS
OXYGEN SATURATION: 96 % | SYSTOLIC BLOOD PRESSURE: 120 MMHG | WEIGHT: 201.6 LBS | TEMPERATURE: 99.5 F | DIASTOLIC BLOOD PRESSURE: 74 MMHG | RESPIRATION RATE: 18 BRPM | BODY MASS INDEX: 27.34 KG/M2 | HEART RATE: 84 BPM

## 2022-06-15 DIAGNOSIS — L30.9 DERMATITIS: Primary | ICD-10-CM

## 2022-06-15 PROCEDURE — G8419 CALC BMI OUT NRM PARAM NOF/U: HCPCS | Performed by: NURSE PRACTITIONER

## 2022-06-15 PROCEDURE — 99213 OFFICE O/P EST LOW 20 MIN: CPT | Performed by: NURSE PRACTITIONER

## 2022-06-15 PROCEDURE — G8427 DOCREV CUR MEDS BY ELIG CLIN: HCPCS | Performed by: NURSE PRACTITIONER

## 2022-06-15 PROCEDURE — 96372 THER/PROPH/DIAG INJ SC/IM: CPT | Performed by: NURSE PRACTITIONER

## 2022-06-15 PROCEDURE — 1036F TOBACCO NON-USER: CPT | Performed by: NURSE PRACTITIONER

## 2022-06-15 RX ORDER — METHYLPREDNISOLONE ACETATE 80 MG/ML
80 INJECTION, SUSPENSION INTRA-ARTICULAR; INTRALESIONAL; INTRAMUSCULAR; SOFT TISSUE ONCE
Status: COMPLETED | OUTPATIENT
Start: 2022-06-15 | End: 2022-06-15

## 2022-06-15 RX ADMIN — METHYLPREDNISOLONE ACETATE 80 MG: 80 INJECTION, SUSPENSION INTRA-ARTICULAR; INTRALESIONAL; INTRAMUSCULAR; SOFT TISSUE at 16:07

## 2022-06-15 ASSESSMENT — ENCOUNTER SYMPTOMS: DIARRHEA: 0

## 2022-06-15 NOTE — PATIENT INSTRUCTIONS
Patient Education        Dermatitis: Care Instructions  Overview  Dermatitis is the general name used for any rash or inflammation of the skin. Different kinds of dermatitis cause different kinds of rashes. Common causes of a rash include new medicines, plants (such as poison oak or poison ivy), heat,and stress. Certain illnesses can also cause a rash. An allergic reaction to something that touches your skin, such as latex, nickel, or poison ivy, is called contact dermatitis. Contact dermatitis may also be caused by something that irritates the skin, such as bleach, achemical, or soap. These types of rashes cannot be spread from person to person. How long your rash will last depends on what caused it. Rashes may last a fewdays or months. Follow-up care is a key part of your treatment and safety. Be sure to make and go to all appointments, and call your doctor if you are having problems. It's also a good idea to know your test results and keep alist of the medicines you take. How can you care for yourself at home?  Do not scratch the rash. Cut your nails short, and file them smooth. Or wear gloves if this helps keep you from scratching.  Wash the area with water only. Pat dry.  Put cold, wet cloths on the rash to reduce itching.  Keep cool, and stay out of the sun.  Leave the rash open to the air as much as possible.  If the rash itches, use hydrocortisone cream. Follow the directions on the label. Calamine lotion may help for plant rashes.  If itching affects your sleep, ask your doctor if you can take an antihistamine that might reduce itching and make you sleepy, such as diphenhydramine (Benadryl). Be safe with medicines. Read and follow all instructions on the label.  If your doctor prescribed a cream, use it as directed. If your doctor prescribed medicine, take it exactly as directed. When should you call for help?    Call your doctor now or seek immediate medical care if:     You have symptoms of infection, such as:  ? Increased pain, swelling, warmth, or redness. ? Red streaks leading from the area. ? Pus draining from the area. ? A fever.      You have joint pain along with the rash. Watch closely for changes in your health, and be sure to contact your doctor if:     Your rash is changing or getting worse.      You are not getting better as expected. Where can you learn more? Go to https://[employer] Health SolutionspeNsGeneeweb.Riskthinktank. org and sign in to your "[employer]" account. Enter [phone] in the [employer] box to learn more about \"Dermatitis: Care Instructions. \"     If you do not have an account, please click on the \"Sign Up Now\" link. Current as of: November 15, 2021               Content Version: 13.2  © 3322-5077 Healthwise, Incorporated. Care instructions adapted under license by [facility] ([facility]). If you have questions about a medical condition or this instruction, always ask your healthcare professional. [place] [address] any warranty or liability for your use of this information.

## 2022-06-15 NOTE — PROGRESS NOTES
Sussy Avelar (:  1980) is a 35 y.o. male,Established patient, here for evaluation of the following chief complaint(s):  Rash (torso, arms, had some bumps x 6 months)         ASSESSMENT/PLAN:  1. Dermatitis  - Chronic  - Etiology unclear  - Testing for syphilis ordered  - Treat with IM steroid- monitor for improvement in symptoms  - Consider dermatology or allergy referral  -     RPR; Future  -     hydrocortisone sodium succinate PF (SOLU-CORTEF) injection 50 mg; 50 mg, IntraMUSCular, ONCE, 1 dose, On Wed 6/15/22 at 1545  -     methylPREDNISolone acetate (DEPO-MEDROL) injection 80 mg; 80 mg, IntraMUSCular, ONCE, 1 dose, On Wed 6/15/22 at 1545      Return if symptoms worsen or fail to improve. Subjective   SUBJECTIVE/OBJECTIVE:  Rash  This is a chronic problem. The current episode started more than 1 month ago. The affected locations include the left arm, right arm, abdomen, back, right ankle and left ankle. The rash is characterized by redness and blistering. He was exposed to nothing. Pertinent negatives include no diarrhea, fever or joint pain. Past treatments include topical steroids. There is no history of allergies. Review of Systems   Constitutional: Negative for fever. Gastrointestinal: Negative for diarrhea. Musculoskeletal: Negative for joint pain. Skin: Positive for rash. Objective   Physical Exam  Constitutional:       General: He is not in acute distress. Appearance: Normal appearance. HENT:      Head: Normocephalic and atraumatic. Right Ear: External ear normal.      Left Ear: External ear normal.      Nose: No nasal deformity or rhinorrhea. Mouth/Throat:      Lips: Pink. No lesions. Eyes:      General: Lids are normal.         Right eye: No discharge. Left eye: No discharge. Conjunctiva/sclera: Conjunctivae normal.   Pulmonary:      Effort: No accessory muscle usage or respiratory distress. Breath sounds: No stridor. Musculoskeletal:      Cervical back: Normal range of motion. Skin:     Coloration: Skin is not pale. Findings: Rash present. Rash is macular and papular. Neurological:      General: No focal deficit present. Mental Status: He is alert. Mental status is at baseline. Psychiatric:         Mood and Affect: Mood normal.         Behavior: Behavior is cooperative. An electronic signature was used to authenticate this note.     --EMRE Saleh - CNP

## 2022-06-15 NOTE — PROGRESS NOTES
After obtaining consent, and per orders of Melchor Velez CNP, injection of Solu-Cortef 50 mg/mL and Depo-Medrol 80 mg/mL given in right dorsogluteal IM by Glo Huff. Patient tolerated well.     Administrations This Visit     hydrocortisone sodium succinate PF (SOLU-CORTEF) injection 50 mg     Admin Date  06/15/2022 Action  Given Dose  50 mg Route  IntraMUSCular Administered By  Anthony Grewal          methylPREDNISolone acetate (DEPO-MEDROL) injection 80 mg     Admin Date  06/15/2022 Action  Given Dose  80 mg Route  IntraMUSCular Administered By  Glo Huff

## 2022-06-16 ENCOUNTER — TELEPHONE (OUTPATIENT)
Dept: FAMILY MEDICINE CLINIC | Age: 42
End: 2022-06-16

## 2022-06-16 LAB — RPR: NONREACTIVE

## 2022-06-16 NOTE — TELEPHONE ENCOUNTER
----- Message from EMRE Arnold CNP sent at 6/16/2022  3:22 PM EDT -----  Syphilis screening is  negative.

## 2023-04-26 ENCOUNTER — TELEPHONE (OUTPATIENT)
Dept: FAMILY MEDICINE CLINIC | Age: 43
End: 2023-04-26

## 2023-04-26 ENCOUNTER — OFFICE VISIT (OUTPATIENT)
Dept: FAMILY MEDICINE CLINIC | Age: 43
End: 2023-04-26

## 2023-04-26 VITALS
HEART RATE: 80 BPM | WEIGHT: 195.8 LBS | BODY MASS INDEX: 26.52 KG/M2 | TEMPERATURE: 97.9 F | DIASTOLIC BLOOD PRESSURE: 78 MMHG | SYSTOLIC BLOOD PRESSURE: 126 MMHG | RESPIRATION RATE: 18 BRPM | HEIGHT: 72 IN

## 2023-04-26 DIAGNOSIS — Z00.00 WELL ADULT EXAM: Primary | ICD-10-CM

## 2023-04-26 SDOH — ECONOMIC STABILITY: FOOD INSECURITY: WITHIN THE PAST 12 MONTHS, THE FOOD YOU BOUGHT JUST DIDN'T LAST AND YOU DIDN'T HAVE MONEY TO GET MORE.: NEVER TRUE

## 2023-04-26 SDOH — ECONOMIC STABILITY: INCOME INSECURITY: HOW HARD IS IT FOR YOU TO PAY FOR THE VERY BASICS LIKE FOOD, HOUSING, MEDICAL CARE, AND HEATING?: NOT HARD AT ALL

## 2023-04-26 SDOH — ECONOMIC STABILITY: HOUSING INSECURITY
IN THE LAST 12 MONTHS, WAS THERE A TIME WHEN YOU DID NOT HAVE A STEADY PLACE TO SLEEP OR SLEPT IN A SHELTER (INCLUDING NOW)?: NO

## 2023-04-26 SDOH — ECONOMIC STABILITY: FOOD INSECURITY: WITHIN THE PAST 12 MONTHS, YOU WORRIED THAT YOUR FOOD WOULD RUN OUT BEFORE YOU GOT MONEY TO BUY MORE.: NEVER TRUE

## 2023-04-26 ASSESSMENT — PATIENT HEALTH QUESTIONNAIRE - PHQ9
1. LITTLE INTEREST OR PLEASURE IN DOING THINGS: 0
SUM OF ALL RESPONSES TO PHQ QUESTIONS 1-9: 0
SUM OF ALL RESPONSES TO PHQ9 QUESTIONS 1 & 2: 0
2. FEELING DOWN, DEPRESSED OR HOPELESS: 0
SUM OF ALL RESPONSES TO PHQ QUESTIONS 1-9: 0

## 2023-04-26 ASSESSMENT — ENCOUNTER SYMPTOMS
ABDOMINAL PAIN: 0
VOMITING: 0
CONSTIPATION: 0
BACK PAIN: 0
BLOOD IN STOOL: 0
NAUSEA: 0
EYE PAIN: 0
DIARRHEA: 0
RHINORRHEA: 0
COUGH: 0
SHORTNESS OF BREATH: 0
WHEEZING: 0
SORE THROAT: 0
CHEST TIGHTNESS: 0

## 2023-04-26 NOTE — PROGRESS NOTES
2023    Abram Muñoz (:  1980) is a 38 y.o. male, here for a preventive medicine evaluation. Patient Active Problem List   Diagnosis    Developmental delay    Overweight (BMI 25.0-29. 9)       Review of Systems   Constitutional:  Negative for chills, fatigue, fever and unexpected weight change. HENT:  Negative for congestion, ear pain, rhinorrhea and sore throat. Eyes:  Negative for pain and visual disturbance. Respiratory:  Negative for cough, chest tightness, shortness of breath and wheezing. Cardiovascular:  Negative for chest pain and palpitations. Gastrointestinal:  Negative for abdominal pain, blood in stool, constipation, diarrhea, nausea and vomiting. Genitourinary:  Negative for difficulty urinating, frequency, hematuria and urgency. Musculoskeletal:  Negative for back pain, joint swelling, myalgias and neck pain. Skin:  Negative for rash. Neurological:  Negative for dizziness and headaches. Hematological:  Negative for adenopathy. Does not bruise/bleed easily. Psychiatric/Behavioral:  Negative for behavioral problems and sleep disturbance. The patient is not nervous/anxious. Prior to Visit Medications    Not on File        No Known Allergies    Past Medical History:   Diagnosis Date    Headache     MR (mental retardation)     IQ - 72       History reviewed. No pertinent surgical history.     Social History     Socioeconomic History    Marital status: Single     Spouse name: Not on file    Number of children: Not on file    Years of education: Not on file    Highest education level: Not on file   Occupational History    Not on file   Tobacco Use    Smoking status: Never    Smokeless tobacco: Never   Vaping Use    Vaping Use: Never used   Substance and Sexual Activity    Alcohol use: No    Drug use: No    Sexual activity: Yes   Other Topics Concern    Not on file   Social History Narrative    Not on file     Social Determinants of Health     Financial Resource

## 2023-07-03 ENCOUNTER — HOSPITAL ENCOUNTER (EMERGENCY)
Age: 43
Discharge: HOME | End: 2023-07-03
Payer: MEDICARE

## 2023-07-03 VITALS
DIASTOLIC BLOOD PRESSURE: 86 MMHG | WEIGHT: 192 LBS | HEART RATE: 93 BPM | SYSTOLIC BLOOD PRESSURE: 122 MMHG | BODY MASS INDEX: 26.01 KG/M2 | HEIGHT: 72 IN | TEMPERATURE: 97.6 F | RESPIRATION RATE: 18 BRPM | OXYGEN SATURATION: 97 %

## 2023-07-03 DIAGNOSIS — L92.3 TATTOO REACTION: Primary | ICD-10-CM

## 2023-07-03 PROCEDURE — 99213 OFFICE O/P EST LOW 20 MIN: CPT

## 2023-07-03 PROCEDURE — 99213 OFFICE O/P EST LOW 20 MIN: CPT | Performed by: NURSE PRACTITIONER

## 2023-07-03 RX ORDER — CEPHALEXIN 500 MG/1
500 CAPSULE ORAL 4 TIMES DAILY
Qty: 40 CAPSULE | Refills: 0 | Status: SHIPPED | OUTPATIENT
Start: 2023-07-03 | End: 2023-07-13

## 2023-07-03 ASSESSMENT — ENCOUNTER SYMPTOMS
SHORTNESS OF BREATH: 0
VOMITING: 0
NAUSEA: 0

## 2023-07-03 ASSESSMENT — PAIN - FUNCTIONAL ASSESSMENT: PAIN_FUNCTIONAL_ASSESSMENT: NONE - DENIES PAIN

## 2023-07-03 NOTE — ED NOTES
PT GIVEN DISCHARGE INSTRUCTIONS, VERBALIZES UNDERSTANDING. PT ASSESSMENT UNCHANGED, DISCHARGED IN STABLE CONDITION.          Shemar Bentley RN  07/03/23 [id number]

## 2023-07-05 ENCOUNTER — TELEPHONE (OUTPATIENT)
Dept: FAMILY MEDICINE CLINIC | Age: 43
End: 2023-07-05

## 2024-04-12 ENCOUNTER — HOSPITAL ENCOUNTER (EMERGENCY)
Age: 44
Discharge: HOME | End: 2024-04-12
Payer: MEDICARE

## 2024-04-12 VITALS
SYSTOLIC BLOOD PRESSURE: 135 MMHG | BODY MASS INDEX: 28.04 KG/M2 | RESPIRATION RATE: 20 BRPM | HEART RATE: 103 BPM | WEIGHT: 207 LBS | OXYGEN SATURATION: 100 % | TEMPERATURE: 97.8 F | DIASTOLIC BLOOD PRESSURE: 90 MMHG | HEIGHT: 72 IN

## 2024-04-12 DIAGNOSIS — K64.8 INTERNAL HEMORRHOIDS: Primary | ICD-10-CM

## 2024-04-12 PROCEDURE — 99213 OFFICE O/P EST LOW 20 MIN: CPT | Performed by: NURSE PRACTITIONER

## 2024-04-12 PROCEDURE — 99213 OFFICE O/P EST LOW 20 MIN: CPT

## 2024-04-12 RX ORDER — LIDOCAINE HYDROCHLORIDE 40 MG/ML
SOLUTION TOPICAL ONCE
Status: DISCONTINUED | OUTPATIENT
Start: 2024-04-12 | End: 2024-04-12

## 2024-04-12 RX ORDER — POLYETHYLENE GLYCOL 3350 17 G/17G
17 POWDER, FOR SOLUTION ORAL DAILY
Qty: 510 G | Refills: 0 | Status: SHIPPED | OUTPATIENT
Start: 2024-04-12 | End: 2024-05-12

## 2024-04-12 RX ORDER — LIDOCAINE 50 MG/G
OINTMENT TOPICAL
Qty: 50 G | Refills: 0 | Status: SHIPPED | OUTPATIENT
Start: 2024-04-12

## 2024-04-12 RX ORDER — BENZOCAINE/MENTHOL 6 MG-10 MG
LOZENGE MUCOUS MEMBRANE
Qty: 30 G | Refills: 0 | Status: SHIPPED | OUTPATIENT
Start: 2024-04-12

## 2024-04-12 RX ORDER — HYDROCORTISONE ACETATE 25 MG/1
25 SUPPOSITORY RECTAL 2 TIMES DAILY
Status: DISCONTINUED | OUTPATIENT
Start: 2024-04-12 | End: 2024-04-12

## 2024-04-12 ASSESSMENT — PAIN - FUNCTIONAL ASSESSMENT: PAIN_FUNCTIONAL_ASSESSMENT: NONE - DENIES PAIN

## 2024-04-12 ASSESSMENT — ENCOUNTER SYMPTOMS: ANAL BLEEDING: 1

## 2024-04-12 NOTE — ED PROVIDER NOTES
[facility] URGENT CARE  UrgentCare Encounter      CHIEFCOMPLAINT       Chief Complaint   Patient presents with    Hemorrhoids       Nurses Notes reviewed and I agree except as noted in the HPI.  HISTORY OF PRESENT ILLNESS   Ron De Paz is a 43 y.o. male who presents to urgent care with complaints of blood to his bottom when he wipes.  He denies any blood in his stool.  Denies any pain.    REVIEW OF SYSTEMS     Review of Systems   Gastrointestinal:  Positive for anal bleeding.       PAST MEDICAL HISTORY         Diagnosis Date    Headache     MR (mental retardation)     IQ - 65       SURGICAL HISTORY     Patient  has no past surgical history on file.    CURRENT MEDICATIONS       Discharge Medication List as of 4/12/2024  6:15 PM          ALLERGIES     Patient is has No Known Allergies.    FAMILY HISTORY     Patient'sfamily history includes High Blood Pressure in his father; High Cholesterol in his mother.    SOCIAL HISTORY     Patient  reports that he has never smoked. He has never used smokeless tobacco. He reports that he does not drink alcohol and does not use drugs.    PHYSICAL EXAM     ED TRIAGE VITALS  BP: (!) 135/90, Temp: 97.8 °F (36.6 °C), Pulse: (!) 103, Respirations: 20, SpO2: 100 %  Physical Exam  Constitutional:       General: He is not in acute distress.     Appearance: He is well-developed. He is not ill-appearing or toxic-appearing.   HENT:      Head: Normocephalic and atraumatic.      Right Ear: Tympanic membrane normal.      Left Ear: Tympanic membrane normal.      Nose: No congestion or rhinorrhea.      Mouth/Throat:      Mouth: Mucous membranes are moist.      Pharynx: Oropharynx is clear. No pharyngeal swelling, posterior oropharyngeal erythema or uvula swelling.      Tonsils: 0 on the right. 0 on the left.   Eyes:      Conjunctiva/sclera: Conjunctivae normal.      Pupils: Pupils are equal, round, and reactive to light.   Cardiovascular:      Rate and Rhythm: Normal rate and

## 2024-08-12 ENCOUNTER — TELEPHONE (OUTPATIENT)
Dept: OTHER | Facility: CLINIC | Age: 44
End: 2024-08-12

## 2024-08-12 NOTE — TELEPHONE ENCOUNTER
Patient was outreached to as part of Population Health scheduling activity. Patient may be due for a visit with their primary care provider.    Outcome of call: PATIENT UNAVAILABLE - LEFT VOICEMAIL    [phone] (home)   LM H#, call office to schedule AWV